# Patient Record
Sex: FEMALE | Race: BLACK OR AFRICAN AMERICAN | NOT HISPANIC OR LATINO
[De-identification: names, ages, dates, MRNs, and addresses within clinical notes are randomized per-mention and may not be internally consistent; named-entity substitution may affect disease eponyms.]

---

## 2017-06-22 PROBLEM — Z00.00 ENCOUNTER FOR PREVENTIVE HEALTH EXAMINATION: Status: ACTIVE | Noted: 2017-06-22

## 2017-08-07 ENCOUNTER — APPOINTMENT (OUTPATIENT)
Dept: OBGYN | Facility: CLINIC | Age: 32
End: 2017-08-07
Payer: OTHER GOVERNMENT

## 2017-08-07 ENCOUNTER — NON-APPOINTMENT (OUTPATIENT)
Age: 32
End: 2017-08-07

## 2017-08-07 VITALS
WEIGHT: 261.5 LBS | SYSTOLIC BLOOD PRESSURE: 121 MMHG | HEIGHT: 66 IN | BODY MASS INDEX: 42.02 KG/M2 | DIASTOLIC BLOOD PRESSURE: 78 MMHG

## 2017-08-07 DIAGNOSIS — D57.3 SICKLE-CELL TRAIT: ICD-10-CM

## 2017-08-07 DIAGNOSIS — Z86.19 PERSONAL HISTORY OF OTHER INFECTIOUS AND PARASITIC DISEASES: ICD-10-CM

## 2017-08-07 PROCEDURE — 90715 TDAP VACCINE 7 YRS/> IM: CPT

## 2017-08-07 PROCEDURE — 90471 IMMUNIZATION ADMIN: CPT

## 2017-08-07 PROCEDURE — 0501F PRENATAL FLOW SHEET: CPT

## 2017-08-07 RX ORDER — PRENATAL VIT NO.130/IRON/FOLIC 27MG-0.8MG
TABLET ORAL
Refills: 0 | Status: ACTIVE | COMMUNITY

## 2017-08-07 RX ORDER — VALACYCLOVIR 500 MG/1
TABLET, FILM COATED ORAL
Refills: 0 | Status: ACTIVE | COMMUNITY

## 2017-08-17 ENCOUNTER — APPOINTMENT (OUTPATIENT)
Dept: ANTEPARTUM | Facility: CLINIC | Age: 32
End: 2017-08-17
Payer: OTHER GOVERNMENT

## 2017-08-17 ENCOUNTER — ASOB RESULT (OUTPATIENT)
Age: 32
End: 2017-08-17

## 2017-08-17 PROCEDURE — 76805 OB US >/= 14 WKS SNGL FETUS: CPT

## 2017-08-17 PROCEDURE — 76819 FETAL BIOPHYS PROFIL W/O NST: CPT

## 2017-08-21 ENCOUNTER — APPOINTMENT (OUTPATIENT)
Dept: OBGYN | Facility: CLINIC | Age: 32
End: 2017-08-21
Payer: OTHER GOVERNMENT

## 2017-08-21 VITALS
DIASTOLIC BLOOD PRESSURE: 73 MMHG | WEIGHT: 260 LBS | SYSTOLIC BLOOD PRESSURE: 112 MMHG | BODY MASS INDEX: 41.78 KG/M2 | HEIGHT: 66 IN

## 2017-08-21 PROCEDURE — 0502F SUBSEQUENT PRENATAL CARE: CPT

## 2017-09-07 ENCOUNTER — APPOINTMENT (OUTPATIENT)
Dept: ANTEPARTUM | Facility: CLINIC | Age: 32
End: 2017-09-07
Payer: OTHER GOVERNMENT

## 2017-09-07 ENCOUNTER — ASOB RESULT (OUTPATIENT)
Age: 32
End: 2017-09-07

## 2017-09-07 ENCOUNTER — APPOINTMENT (OUTPATIENT)
Dept: OBGYN | Facility: CLINIC | Age: 32
End: 2017-09-07
Payer: OTHER GOVERNMENT

## 2017-09-07 VITALS
BODY MASS INDEX: 41.88 KG/M2 | WEIGHT: 260.56 LBS | HEIGHT: 66 IN | SYSTOLIC BLOOD PRESSURE: 136 MMHG | DIASTOLIC BLOOD PRESSURE: 81 MMHG

## 2017-09-07 PROCEDURE — 76816 OB US FOLLOW-UP PER FETUS: CPT

## 2017-09-07 PROCEDURE — 0502F SUBSEQUENT PRENATAL CARE: CPT

## 2017-09-07 PROCEDURE — 76819 FETAL BIOPHYS PROFIL W/O NST: CPT

## 2017-09-10 LAB
C TRACH RRNA SPEC QL NAA+PROBE: NORMAL
GP B STREP DNA SPEC QL NAA+PROBE: DETECTED
GP B STREP DNA SPEC QL NAA+PROBE: NORMAL
HIV1+2 AB SPEC QL IA.RAPID: NONREACTIVE
N GONORRHOEA RRNA SPEC QL NAA+PROBE: NORMAL
SOURCE AMPLIFICATION: NORMAL
SOURCE GBS: NORMAL

## 2017-09-13 ENCOUNTER — APPOINTMENT (OUTPATIENT)
Dept: OBGYN | Facility: CLINIC | Age: 32
End: 2017-09-13
Payer: OTHER GOVERNMENT

## 2017-09-13 VITALS
BODY MASS INDEX: 41.62 KG/M2 | WEIGHT: 259 LBS | SYSTOLIC BLOOD PRESSURE: 121 MMHG | DIASTOLIC BLOOD PRESSURE: 78 MMHG | HEIGHT: 66 IN

## 2017-09-13 VITALS — HEIGHT: 66 IN

## 2017-09-13 PROCEDURE — 0502F SUBSEQUENT PRENATAL CARE: CPT

## 2017-09-20 ENCOUNTER — APPOINTMENT (OUTPATIENT)
Dept: OBGYN | Facility: CLINIC | Age: 32
End: 2017-09-20
Payer: OTHER GOVERNMENT

## 2017-09-20 VITALS
DIASTOLIC BLOOD PRESSURE: 74 MMHG | SYSTOLIC BLOOD PRESSURE: 116 MMHG | WEIGHT: 262 LBS | BODY MASS INDEX: 42.11 KG/M2 | HEIGHT: 66 IN

## 2017-09-20 PROCEDURE — 0502F SUBSEQUENT PRENATAL CARE: CPT

## 2017-09-25 ENCOUNTER — OUTPATIENT (OUTPATIENT)
Dept: OUTPATIENT SERVICES | Facility: HOSPITAL | Age: 32
LOS: 1 days | End: 2017-09-25

## 2017-09-25 DIAGNOSIS — O26.899 OTHER SPECIFIED PREGNANCY RELATED CONDITIONS, UNSPECIFIED TRIMESTER: ICD-10-CM

## 2017-09-25 DIAGNOSIS — Z3A.00 WEEKS OF GESTATION OF PREGNANCY NOT SPECIFIED: ICD-10-CM

## 2017-09-25 LAB
ALBUMIN SERPL ELPH-MCNC: 3.3 G/DL — SIGNIFICANT CHANGE UP (ref 3.3–5)
ALP SERPL-CCNC: 107 U/L — SIGNIFICANT CHANGE UP (ref 40–120)
ALT FLD-CCNC: 7 U/L — SIGNIFICANT CHANGE UP (ref 4–33)
AST SERPL-CCNC: 10 U/L — SIGNIFICANT CHANGE UP (ref 4–32)
BASOPHILS # BLD AUTO: 0.02 K/UL — SIGNIFICANT CHANGE UP (ref 0–0.2)
BASOPHILS NFR BLD AUTO: 0.3 % — SIGNIFICANT CHANGE UP (ref 0–2)
BILIRUB SERPL-MCNC: < 0.2 MG/DL — LOW (ref 0.2–1.2)
BUN SERPL-MCNC: 7 MG/DL — SIGNIFICANT CHANGE UP (ref 7–23)
CALCIUM SERPL-MCNC: 9 MG/DL — SIGNIFICANT CHANGE UP (ref 8.4–10.5)
CHLORIDE SERPL-SCNC: 106 MMOL/L — SIGNIFICANT CHANGE UP (ref 98–107)
CO2 SERPL-SCNC: 20 MMOL/L — LOW (ref 22–31)
CREAT SERPL-MCNC: 0.55 MG/DL — SIGNIFICANT CHANGE UP (ref 0.5–1.3)
EOSINOPHIL # BLD AUTO: 0.04 K/UL — SIGNIFICANT CHANGE UP (ref 0–0.5)
EOSINOPHIL NFR BLD AUTO: 0.6 % — SIGNIFICANT CHANGE UP (ref 0–6)
GLUCOSE SERPL-MCNC: 104 MG/DL — HIGH (ref 70–99)
HCT VFR BLD CALC: 30 % — LOW (ref 34.5–45)
HGB BLD-MCNC: 9.6 G/DL — LOW (ref 11.5–15.5)
IMM GRANULOCYTES # BLD AUTO: 0.04 # — SIGNIFICANT CHANGE UP
IMM GRANULOCYTES NFR BLD AUTO: 0.6 % — SIGNIFICANT CHANGE UP (ref 0–1.5)
LYMPHOCYTES # BLD AUTO: 1.85 K/UL — SIGNIFICANT CHANGE UP (ref 1–3.3)
LYMPHOCYTES # BLD AUTO: 26.6 % — SIGNIFICANT CHANGE UP (ref 13–44)
MCHC RBC-ENTMCNC: 24 PG — LOW (ref 27–34)
MCHC RBC-ENTMCNC: 32 % — SIGNIFICANT CHANGE UP (ref 32–36)
MCV RBC AUTO: 75 FL — LOW (ref 80–100)
MONOCYTES # BLD AUTO: 0.62 K/UL — SIGNIFICANT CHANGE UP (ref 0–0.9)
MONOCYTES NFR BLD AUTO: 8.9 % — SIGNIFICANT CHANGE UP (ref 2–14)
NEUTROPHILS # BLD AUTO: 4.38 K/UL — SIGNIFICANT CHANGE UP (ref 1.8–7.4)
NEUTROPHILS NFR BLD AUTO: 63 % — SIGNIFICANT CHANGE UP (ref 43–77)
NRBC # FLD: 0 — SIGNIFICANT CHANGE UP
PLATELET # BLD AUTO: 279 K/UL — SIGNIFICANT CHANGE UP (ref 150–400)
PMV BLD: 10.7 FL — SIGNIFICANT CHANGE UP (ref 7–13)
POTASSIUM SERPL-MCNC: 4 MMOL/L — SIGNIFICANT CHANGE UP (ref 3.5–5.3)
POTASSIUM SERPL-SCNC: 4 MMOL/L — SIGNIFICANT CHANGE UP (ref 3.5–5.3)
PROT SERPL-MCNC: 6.5 G/DL — SIGNIFICANT CHANGE UP (ref 6–8.3)
RBC # BLD: 4 M/UL — SIGNIFICANT CHANGE UP (ref 3.8–5.2)
RBC # FLD: 15.7 % — HIGH (ref 10.3–14.5)
SODIUM SERPL-SCNC: 139 MMOL/L — SIGNIFICANT CHANGE UP (ref 135–145)
WBC # BLD: 6.95 K/UL — SIGNIFICANT CHANGE UP (ref 3.8–10.5)
WBC # FLD AUTO: 6.95 K/UL — SIGNIFICANT CHANGE UP (ref 3.8–10.5)

## 2017-09-26 PROBLEM — Z00.00 ENCOUNTER FOR PREVENTIVE HEALTH EXAMINATION: Noted: 2017-09-26

## 2017-09-27 LAB — BILE AC FLD-MCNC: 11.7 UMOL/L — SIGNIFICANT CHANGE UP (ref 4.7–24.5)

## 2017-09-28 ENCOUNTER — APPOINTMENT (OUTPATIENT)
Dept: ANTEPARTUM | Facility: CLINIC | Age: 32
End: 2017-09-28
Payer: OTHER GOVERNMENT

## 2017-09-28 ENCOUNTER — OUTPATIENT (OUTPATIENT)
Dept: OUTPATIENT SERVICES | Facility: HOSPITAL | Age: 32
LOS: 1 days | End: 2017-09-28

## 2017-09-28 ENCOUNTER — APPOINTMENT (OUTPATIENT)
Dept: ANTEPARTUM | Facility: CLINIC | Age: 32
End: 2017-09-28

## 2017-09-28 ENCOUNTER — ASOB RESULT (OUTPATIENT)
Age: 32
End: 2017-09-28

## 2017-09-28 ENCOUNTER — APPOINTMENT (OUTPATIENT)
Dept: ANTEPARTUM | Facility: HOSPITAL | Age: 32
End: 2017-09-28

## 2017-09-28 PROCEDURE — 76818 FETAL BIOPHYS PROFILE W/NST: CPT | Mod: 26

## 2017-09-28 PROCEDURE — 76816 OB US FOLLOW-UP PER FETUS: CPT

## 2017-09-29 ENCOUNTER — APPOINTMENT (OUTPATIENT)
Dept: OBGYN | Facility: CLINIC | Age: 32
End: 2017-09-29
Payer: OTHER GOVERNMENT

## 2017-09-29 VITALS
DIASTOLIC BLOOD PRESSURE: 67 MMHG | WEIGHT: 258 LBS | SYSTOLIC BLOOD PRESSURE: 101 MMHG | BODY MASS INDEX: 41.46 KG/M2 | HEIGHT: 66 IN

## 2017-09-29 PROCEDURE — 0502F SUBSEQUENT PRENATAL CARE: CPT

## 2017-09-29 PROCEDURE — 90688 IIV4 VACCINE SPLT 0.5 ML IM: CPT

## 2017-09-29 PROCEDURE — 90471 IMMUNIZATION ADMIN: CPT

## 2017-10-04 ENCOUNTER — APPOINTMENT (OUTPATIENT)
Dept: OBGYN | Facility: CLINIC | Age: 32
End: 2017-10-04
Payer: OTHER GOVERNMENT

## 2017-10-04 VITALS
HEIGHT: 66 IN | BODY MASS INDEX: 41.3 KG/M2 | DIASTOLIC BLOOD PRESSURE: 80 MMHG | SYSTOLIC BLOOD PRESSURE: 119 MMHG | WEIGHT: 257 LBS

## 2017-10-04 PROCEDURE — 0502F SUBSEQUENT PRENATAL CARE: CPT

## 2017-10-06 ENCOUNTER — ASOB RESULT (OUTPATIENT)
Age: 32
End: 2017-10-06

## 2017-10-06 ENCOUNTER — APPOINTMENT (OUTPATIENT)
Dept: ANTEPARTUM | Facility: CLINIC | Age: 32
End: 2017-10-06
Payer: OTHER GOVERNMENT

## 2017-10-06 PROCEDURE — 76819 FETAL BIOPHYS PROFIL W/O NST: CPT

## 2017-10-09 ENCOUNTER — APPOINTMENT (OUTPATIENT)
Dept: ANTEPARTUM | Facility: CLINIC | Age: 32
End: 2017-10-09
Payer: OTHER GOVERNMENT

## 2017-10-09 ENCOUNTER — APPOINTMENT (OUTPATIENT)
Dept: ANTEPARTUM | Facility: HOSPITAL | Age: 32
End: 2017-10-09

## 2017-10-09 ENCOUNTER — OUTPATIENT (OUTPATIENT)
Dept: OUTPATIENT SERVICES | Facility: HOSPITAL | Age: 32
LOS: 1 days | End: 2017-10-09

## 2017-10-09 ENCOUNTER — INPATIENT (INPATIENT)
Facility: HOSPITAL | Age: 32
LOS: 3 days | Discharge: HOME | End: 2017-10-13
Attending: OBSTETRICS & GYNECOLOGY | Admitting: OBSTETRICS & GYNECOLOGY
Payer: OTHER GOVERNMENT

## 2017-10-09 ENCOUNTER — OTHER (OUTPATIENT)
Age: 32
End: 2017-10-09

## 2017-10-09 DIAGNOSIS — O26.899 OTHER SPECIFIED PREGNANCY RELATED CONDITIONS, UNSPECIFIED TRIMESTER: ICD-10-CM

## 2017-10-09 DIAGNOSIS — Z3A.00 WEEKS OF GESTATION OF PREGNANCY NOT SPECIFIED: ICD-10-CM

## 2017-10-09 LAB
BASOPHILS # BLD AUTO: 0.03 K/UL — SIGNIFICANT CHANGE UP (ref 0–0.2)
BASOPHILS NFR BLD AUTO: 0.4 % — SIGNIFICANT CHANGE UP (ref 0–2)
BLD GP AB SCN SERPL QL: NEGATIVE — SIGNIFICANT CHANGE UP
EOSINOPHIL # BLD AUTO: 0.03 K/UL — SIGNIFICANT CHANGE UP (ref 0–0.5)
EOSINOPHIL NFR BLD AUTO: 0.4 % — SIGNIFICANT CHANGE UP (ref 0–6)
HCT VFR BLD CALC: 31.1 % — LOW (ref 34.5–45)
HGB BLD-MCNC: 10.1 G/DL — LOW (ref 11.5–15.5)
IMM GRANULOCYTES # BLD AUTO: 0.04 # — SIGNIFICANT CHANGE UP
IMM GRANULOCYTES NFR BLD AUTO: 0.5 % — SIGNIFICANT CHANGE UP (ref 0–1.5)
LYMPHOCYTES # BLD AUTO: 1.82 K/UL — SIGNIFICANT CHANGE UP (ref 1–3.3)
LYMPHOCYTES # BLD AUTO: 23.7 % — SIGNIFICANT CHANGE UP (ref 13–44)
MCHC RBC-ENTMCNC: 23.9 PG — LOW (ref 27–34)
MCHC RBC-ENTMCNC: 32.5 % — SIGNIFICANT CHANGE UP (ref 32–36)
MCV RBC AUTO: 73.5 FL — LOW (ref 80–100)
MONOCYTES # BLD AUTO: 0.58 K/UL — SIGNIFICANT CHANGE UP (ref 0–0.9)
MONOCYTES NFR BLD AUTO: 7.5 % — SIGNIFICANT CHANGE UP (ref 2–14)
NEUTROPHILS # BLD AUTO: 5.19 K/UL — SIGNIFICANT CHANGE UP (ref 1.8–7.4)
NEUTROPHILS NFR BLD AUTO: 67.5 % — SIGNIFICANT CHANGE UP (ref 43–77)
NRBC # FLD: 0 — SIGNIFICANT CHANGE UP
PLATELET # BLD AUTO: 287 K/UL — SIGNIFICANT CHANGE UP (ref 150–400)
PMV BLD: 10.9 FL — SIGNIFICANT CHANGE UP (ref 7–13)
RBC # BLD: 4.23 M/UL — SIGNIFICANT CHANGE UP (ref 3.8–5.2)
RBC # FLD: 16.2 % — HIGH (ref 10.3–14.5)
RH IG SCN BLD-IMP: POSITIVE — SIGNIFICANT CHANGE UP
RH IG SCN BLD-IMP: POSITIVE — SIGNIFICANT CHANGE UP
WBC # BLD: 7.69 K/UL — SIGNIFICANT CHANGE UP (ref 3.8–10.5)
WBC # FLD AUTO: 7.69 K/UL — SIGNIFICANT CHANGE UP (ref 3.8–10.5)

## 2017-10-09 PROCEDURE — 76818 FETAL BIOPHYS PROFILE W/NST: CPT | Mod: 26

## 2017-10-09 RX ORDER — AMPICILLIN TRIHYDRATE 250 MG
1 CAPSULE ORAL EVERY 4 HOURS
Qty: 0 | Refills: 0 | Status: DISCONTINUED | OUTPATIENT
Start: 2017-10-09 | End: 2017-10-10

## 2017-10-09 RX ORDER — SODIUM CHLORIDE 9 MG/ML
1000 INJECTION, SOLUTION INTRAVENOUS
Qty: 0 | Refills: 0 | Status: DISCONTINUED | OUTPATIENT
Start: 2017-10-09 | End: 2017-10-10

## 2017-10-09 RX ORDER — SODIUM CHLORIDE 9 MG/ML
1000 INJECTION, SOLUTION INTRAVENOUS ONCE
Qty: 0 | Refills: 0 | Status: DISCONTINUED | OUTPATIENT
Start: 2017-10-09 | End: 2017-10-10

## 2017-10-09 RX ORDER — OXYTOCIN 10 UNIT/ML
333.33 VIAL (ML) INJECTION
Qty: 20 | Refills: 0 | Status: DISCONTINUED | OUTPATIENT
Start: 2017-10-09 | End: 2017-10-10

## 2017-10-09 RX ORDER — BUTORPHANOL TARTRATE 2 MG/ML
2 INJECTION, SOLUTION INTRAMUSCULAR; INTRAVENOUS ONCE
Qty: 0 | Refills: 0 | Status: DISCONTINUED | OUTPATIENT
Start: 2017-10-09 | End: 2017-10-09

## 2017-10-09 RX ORDER — AMPICILLIN TRIHYDRATE 250 MG
2 CAPSULE ORAL ONCE
Qty: 0 | Refills: 0 | Status: COMPLETED | OUTPATIENT
Start: 2017-10-09 | End: 2017-10-09

## 2017-10-09 RX ORDER — AMPICILLIN TRIHYDRATE 250 MG
CAPSULE ORAL
Qty: 0 | Refills: 0 | Status: DISCONTINUED | OUTPATIENT
Start: 2017-10-09 | End: 2017-10-10

## 2017-10-09 RX ADMIN — SODIUM CHLORIDE 125 MILLILITER(S): 9 INJECTION, SOLUTION INTRAVENOUS at 16:30

## 2017-10-09 RX ADMIN — BUTORPHANOL TARTRATE 2 MILLIGRAM(S): 2 INJECTION, SOLUTION INTRAMUSCULAR; INTRAVENOUS at 20:30

## 2017-10-09 RX ADMIN — Medication 108 GRAM(S): at 21:40

## 2017-10-09 RX ADMIN — BUTORPHANOL TARTRATE 2 MILLIGRAM(S): 2 INJECTION, SOLUTION INTRAMUSCULAR; INTRAVENOUS at 21:00

## 2017-10-09 RX ADMIN — Medication 216 GRAM(S): at 17:11

## 2017-10-09 RX ADMIN — Medication 204 MILLIGRAM(S): at 20:30

## 2017-10-10 ENCOUNTER — APPOINTMENT (OUTPATIENT)
Dept: OBGYN | Facility: HOSPITAL | Age: 32
End: 2017-10-10

## 2017-10-10 ENCOUNTER — TRANSCRIPTION ENCOUNTER (OUTPATIENT)
Age: 32
End: 2017-10-10

## 2017-10-10 VITALS — WEIGHT: 257.94 LBS | HEIGHT: 66 IN

## 2017-10-10 LAB
BUN SERPL-MCNC: 6 MG/DL — LOW (ref 7–23)
CALCIUM SERPL-MCNC: 8 MG/DL — LOW (ref 8.4–10.5)
CHLORIDE SERPL-SCNC: 103 MMOL/L — SIGNIFICANT CHANGE UP (ref 98–107)
CO2 SERPL-SCNC: 22 MMOL/L — SIGNIFICANT CHANGE UP (ref 22–31)
CREAT SERPL-MCNC: 0.52 MG/DL — SIGNIFICANT CHANGE UP (ref 0.5–1.3)
GLUCOSE SERPL-MCNC: 81 MG/DL — SIGNIFICANT CHANGE UP (ref 70–99)
HCT VFR BLD CALC: 28.6 % — LOW (ref 34.5–45)
HGB BLD-MCNC: 9.5 G/DL — LOW (ref 11.5–15.5)
MCHC RBC-ENTMCNC: 24.5 PG — LOW (ref 27–34)
MCHC RBC-ENTMCNC: 33.2 % — SIGNIFICANT CHANGE UP (ref 32–36)
MCV RBC AUTO: 73.7 FL — LOW (ref 80–100)
NRBC # FLD: 0 — SIGNIFICANT CHANGE UP
PLATELET # BLD AUTO: 227 K/UL — SIGNIFICANT CHANGE UP (ref 150–400)
PMV BLD: 10.7 FL — SIGNIFICANT CHANGE UP (ref 7–13)
POTASSIUM SERPL-MCNC: 4.3 MMOL/L — SIGNIFICANT CHANGE UP (ref 3.5–5.3)
POTASSIUM SERPL-SCNC: 4.3 MMOL/L — SIGNIFICANT CHANGE UP (ref 3.5–5.3)
RBC # BLD: 3.88 M/UL — SIGNIFICANT CHANGE UP (ref 3.8–5.2)
RBC # FLD: 16.3 % — HIGH (ref 10.3–14.5)
RUBV IGG SER-ACNC: 5.4 INDEX — SIGNIFICANT CHANGE UP
RUBV IGG SER-IMP: POSITIVE — SIGNIFICANT CHANGE UP
SODIUM SERPL-SCNC: 136 MMOL/L — SIGNIFICANT CHANGE UP (ref 135–145)
T PALLIDUM AB TITR SER: NEGATIVE — SIGNIFICANT CHANGE UP
WBC # BLD: 8.94 K/UL — SIGNIFICANT CHANGE UP (ref 3.8–10.5)
WBC # FLD AUTO: 8.94 K/UL — SIGNIFICANT CHANGE UP (ref 3.8–10.5)

## 2017-10-10 PROCEDURE — 59515 CESAREAN DELIVERY: CPT | Mod: U8,UB

## 2017-10-10 RX ORDER — OXYTOCIN 10 UNIT/ML
41.67 VIAL (ML) INJECTION
Qty: 20 | Refills: 0 | Status: DISCONTINUED | OUTPATIENT
Start: 2017-10-10 | End: 2017-10-11

## 2017-10-10 RX ORDER — ACETAMINOPHEN 500 MG
975 TABLET ORAL EVERY 6 HOURS
Qty: 0 | Refills: 0 | Status: DISCONTINUED | OUTPATIENT
Start: 2017-10-10 | End: 2017-10-13

## 2017-10-10 RX ORDER — METOCLOPRAMIDE HCL 10 MG
10 TABLET ORAL ONCE
Qty: 0 | Refills: 0 | Status: COMPLETED | OUTPATIENT
Start: 2017-10-10 | End: 2017-10-10

## 2017-10-10 RX ORDER — SODIUM CHLORIDE 9 MG/ML
1000 INJECTION, SOLUTION INTRAVENOUS ONCE
Qty: 0 | Refills: 0 | Status: COMPLETED | OUTPATIENT
Start: 2017-10-10 | End: 2017-10-10

## 2017-10-10 RX ORDER — DIPHENHYDRAMINE HCL 50 MG
25 CAPSULE ORAL EVERY 4 HOURS
Qty: 0 | Refills: 0 | Status: DISCONTINUED | OUTPATIENT
Start: 2017-10-11 | End: 2017-10-11

## 2017-10-10 RX ORDER — OXYCODONE HYDROCHLORIDE 5 MG/1
10 TABLET ORAL
Qty: 0 | Refills: 0 | Status: DISCONTINUED | OUTPATIENT
Start: 2017-10-11 | End: 2017-10-11

## 2017-10-10 RX ORDER — CITRIC ACID/SODIUM CITRATE 300-500 MG
30 SOLUTION, ORAL ORAL ONCE
Qty: 0 | Refills: 0 | Status: COMPLETED | OUTPATIENT
Start: 2017-10-10 | End: 2017-10-10

## 2017-10-10 RX ORDER — SODIUM CHLORIDE 9 MG/ML
1000 INJECTION, SOLUTION INTRAVENOUS
Qty: 0 | Refills: 0 | Status: DISCONTINUED | OUTPATIENT
Start: 2017-10-11 | End: 2017-10-11

## 2017-10-10 RX ORDER — ONDANSETRON 8 MG/1
4 TABLET, FILM COATED ORAL EVERY 6 HOURS
Qty: 0 | Refills: 0 | Status: DISCONTINUED | OUTPATIENT
Start: 2017-10-11 | End: 2017-10-11

## 2017-10-10 RX ORDER — FERROUS SULFATE 325(65) MG
325 TABLET ORAL DAILY
Qty: 0 | Refills: 0 | Status: DISCONTINUED | OUTPATIENT
Start: 2017-10-11 | End: 2017-10-11

## 2017-10-10 RX ORDER — NALOXONE HYDROCHLORIDE 4 MG/.1ML
0.1 SPRAY NASAL
Qty: 0 | Refills: 0 | Status: DISCONTINUED | OUTPATIENT
Start: 2017-10-11 | End: 2017-10-11

## 2017-10-10 RX ORDER — IBUPROFEN 200 MG
600 TABLET ORAL EVERY 6 HOURS
Qty: 0 | Refills: 0 | Status: COMPLETED | OUTPATIENT
Start: 2017-10-10 | End: 2018-09-08

## 2017-10-10 RX ORDER — SODIUM CHLORIDE 9 MG/ML
1000 INJECTION, SOLUTION INTRAVENOUS
Qty: 0 | Refills: 0 | Status: DISCONTINUED | OUTPATIENT
Start: 2017-10-10 | End: 2017-10-11

## 2017-10-10 RX ORDER — OXYCODONE HYDROCHLORIDE 5 MG/1
5 TABLET ORAL
Qty: 0 | Refills: 0 | Status: DISCONTINUED | OUTPATIENT
Start: 2017-10-11 | End: 2017-10-11

## 2017-10-10 RX ORDER — DIPHENHYDRAMINE HCL 50 MG
25 CAPSULE ORAL EVERY 6 HOURS
Qty: 0 | Refills: 0 | Status: DISCONTINUED | OUTPATIENT
Start: 2017-10-11 | End: 2017-10-13

## 2017-10-10 RX ORDER — HEPARIN SODIUM 5000 [USP'U]/ML
5000 INJECTION INTRAVENOUS; SUBCUTANEOUS EVERY 12 HOURS
Qty: 0 | Refills: 0 | Status: DISCONTINUED | OUTPATIENT
Start: 2017-10-10 | End: 2017-10-13

## 2017-10-10 RX ORDER — SIMETHICONE 80 MG/1
80 TABLET, CHEWABLE ORAL EVERY 4 HOURS
Qty: 0 | Refills: 0 | Status: DISCONTINUED | OUTPATIENT
Start: 2017-10-11 | End: 2017-10-13

## 2017-10-10 RX ORDER — IBUPROFEN 200 MG
1 TABLET ORAL
Qty: 0 | Refills: 0 | DISCHARGE
Start: 2017-10-10

## 2017-10-10 RX ORDER — SODIUM CHLORIDE 9 MG/ML
500 INJECTION, SOLUTION INTRAVENOUS ONCE
Qty: 0 | Refills: 0 | Status: COMPLETED | OUTPATIENT
Start: 2017-10-10 | End: 2017-10-10

## 2017-10-10 RX ORDER — OXYCODONE HYDROCHLORIDE 5 MG/1
5 TABLET ORAL EVERY 4 HOURS
Qty: 0 | Refills: 0 | Status: COMPLETED | OUTPATIENT
Start: 2017-10-10 | End: 2017-10-17

## 2017-10-10 RX ORDER — OXYTOCIN 10 UNIT/ML
333.33 VIAL (ML) INJECTION
Qty: 20 | Refills: 0 | Status: DISCONTINUED | OUTPATIENT
Start: 2017-10-10 | End: 2017-10-11

## 2017-10-10 RX ORDER — TETANUS TOXOID, REDUCED DIPHTHERIA TOXOID AND ACELLULAR PERTUSSIS VACCINE, ADSORBED 5; 2.5; 8; 8; 2.5 [IU]/.5ML; [IU]/.5ML; UG/.5ML; UG/.5ML; UG/.5ML
0.5 SUSPENSION INTRAMUSCULAR ONCE
Qty: 0 | Refills: 0 | Status: DISCONTINUED | OUTPATIENT
Start: 2017-10-11 | End: 2017-10-13

## 2017-10-10 RX ORDER — OXYCODONE HYDROCHLORIDE 5 MG/1
5 TABLET ORAL
Qty: 0 | Refills: 0 | Status: COMPLETED | OUTPATIENT
Start: 2017-10-10 | End: 2017-10-17

## 2017-10-10 RX ORDER — KETOROLAC TROMETHAMINE 30 MG/ML
30 SYRINGE (ML) INJECTION EVERY 6 HOURS
Qty: 0 | Refills: 0 | Status: DISCONTINUED | OUTPATIENT
Start: 2017-10-10 | End: 2017-10-12

## 2017-10-10 RX ORDER — HYDROMORPHONE HYDROCHLORIDE 2 MG/ML
1 INJECTION INTRAMUSCULAR; INTRAVENOUS; SUBCUTANEOUS
Qty: 0 | Refills: 0 | Status: DISCONTINUED | OUTPATIENT
Start: 2017-10-10 | End: 2017-10-11

## 2017-10-10 RX ORDER — FAMOTIDINE 10 MG/ML
20 INJECTION INTRAVENOUS ONCE
Qty: 0 | Refills: 0 | Status: COMPLETED | OUTPATIENT
Start: 2017-10-10 | End: 2017-10-10

## 2017-10-10 RX ORDER — DOCUSATE SODIUM 100 MG
100 CAPSULE ORAL
Qty: 0 | Refills: 0 | Status: DISCONTINUED | OUTPATIENT
Start: 2017-10-11 | End: 2017-10-11

## 2017-10-10 RX ORDER — GLYCERIN ADULT
1 SUPPOSITORY, RECTAL RECTAL AT BEDTIME
Qty: 0 | Refills: 0 | Status: DISCONTINUED | OUTPATIENT
Start: 2017-10-11 | End: 2017-10-13

## 2017-10-10 RX ORDER — LANOLIN
1 OINTMENT (GRAM) TOPICAL
Qty: 0 | Refills: 0 | Status: DISCONTINUED | OUTPATIENT
Start: 2017-10-11 | End: 2017-10-13

## 2017-10-10 RX ADMIN — SODIUM CHLORIDE 1000 MILLILITER(S): 9 INJECTION, SOLUTION INTRAVENOUS at 20:00

## 2017-10-10 RX ADMIN — FAMOTIDINE 20 MILLIGRAM(S): 10 INJECTION INTRAVENOUS at 13:20

## 2017-10-10 RX ADMIN — SODIUM CHLORIDE 200 MILLILITER(S): 9 INJECTION, SOLUTION INTRAVENOUS at 21:21

## 2017-10-10 RX ADMIN — Medication 108 GRAM(S): at 01:26

## 2017-10-10 RX ADMIN — Medication 30 MILLILITER(S): at 13:20

## 2017-10-10 RX ADMIN — SODIUM CHLORIDE 2000 MILLILITER(S): 9 INJECTION, SOLUTION INTRAVENOUS at 16:58

## 2017-10-10 RX ADMIN — Medication 108 GRAM(S): at 09:26

## 2017-10-10 RX ADMIN — Medication 125 MILLIUNIT(S)/MIN: at 16:24

## 2017-10-10 RX ADMIN — HYDROMORPHONE HYDROCHLORIDE 1 MILLIGRAM(S): 2 INJECTION INTRAMUSCULAR; INTRAVENOUS; SUBCUTANEOUS at 18:55

## 2017-10-10 RX ADMIN — Medication 108 GRAM(S): at 05:47

## 2017-10-10 RX ADMIN — SODIUM CHLORIDE 2000 MILLILITER(S): 9 INJECTION, SOLUTION INTRAVENOUS at 22:00

## 2017-10-10 RX ADMIN — Medication 10 MILLIGRAM(S): at 13:20

## 2017-10-10 RX ADMIN — HYDROMORPHONE HYDROCHLORIDE 1 MILLIGRAM(S): 2 INJECTION INTRAMUSCULAR; INTRAVENOUS; SUBCUTANEOUS at 18:38

## 2017-10-10 NOTE — DISCHARGE NOTE OB - CARE PROVIDER_API CALL
Twila Blackburn (MD), Obstetrics and Gynecology  [facility] [place] [address]  Phone: [phone]  Fax: [phone]

## 2017-10-10 NOTE — PROGRESS NOTE ADULT - SUBJECTIVE AND OBJECTIVE BOX
Subjective  Patient evaluated at bedside for oliguria s/p c/s.  Feels well, pain well controlled. Patient denies chest pain, shortness of breath, fevers, chills, nausea, vomiting, diarrhea.  Not lightheaded dizzy or weak.    acetaminophen   Tablet. 975 milliGRAM(s) Oral every 6 hours  heparin  Injectable 5000 Unit(s) SubCutaneous every 12 hours  HYDROmorphone  Injectable 1 milliGRAM(s) IV Push every 3 hours PRN  ibuprofen  Tablet 600 milliGRAM(s) Oral every 6 hours  ketorolac   Injectable 30 milliGRAM(s) IV Push every 6 hours  lactated ringers Bolus 1000 milliLiter(s) IV Bolus once  lactated ringers. 1000 milliLiter(s) IV Continuous <Continuous>  oxyCODONE    IR 5 milliGRAM(s) Oral every 3 hours  oxyCODONE    IR 5 milliGRAM(s) Oral every 4 hours PRN  oxytocin Infusion 41.667 milliUNIT(s)/Min IV Continuous <Continuous>  oxytocin Infusion 333.333 milliUNIT(s)/Min IV Continuous <Continuous>  oxytocin Infusion 41.667 milliUNIT(s)/Min IV Continuous <Continuous>      Objective  Physical exam  VS: T(C): 37 (10-10-17 @ 15:26), Max: 37 (10-10-17 @ 15:26)  HR: 68 (10-10-17 @ 21:30) (66 - 88)  BP: 113/53 (10-10-17 @ 21:30) (90/64 - 142/89)  RR: 16 (10-10-17 @ 21:30) (9 - 24)  SpO2: 100% (10-10-17 @ 21:30) (96% - 100%)  Gen: No acute distress, alert and oriented  Abd: soft nt nd, fundus firm  Ext: nontender bilaterally, SCDs in place    FAST: no perihepatic or perisplenic blood, no blood in pouch of britta  Lung sono: no increased b lines.    10-10 @ 07:01  -  10-10 @ 21:55  --------------------------------------------------------  IN: 6725 mL / OUT: 1065 mL / NET: 5660 mL

## 2017-10-10 NOTE — PROGRESS NOTE ADULT - ASSESSMENT
A/P  31yF POD#0 s/p c/s   EBL intraop was 800, uncomplicated.  Patient received 2000mL in OR and has received 1500mL bolus following, currently on maintenance 200cc/hour.  UOP has been 40cc->30cc->50cc->25cc->20cc.  Adequate UOP is for pt is 58cc/hour.  - STAT CBC, BMP  - 1L LR bolus  - Continue to monitor in PACU    D/w Dr Bere Crane PGY3  [id number]

## 2017-10-10 NOTE — DISCHARGE NOTE OB - MEDICATION SUMMARY - MEDICATIONS TO TAKE
I will START or STAY ON the medications listed below when I get home from the hospital:    ibuprofen 600 mg oral tablet  -- 1 tab(s) by mouth every 6 hours  -- Indication: For Weeks of gestation of pregnancy not specified    PrenaCare oral tablet  -- 1 tab(s) by mouth once a day  -- Indication: For Weeks of gestation of pregnancy not specified

## 2017-10-10 NOTE — DISCHARGE NOTE OB - HOSPITAL COURSE
Patient desired initially elective  section. Patient came in with PROM and decided to be induced. Received Po cytotec and progressed to 2.5 cm and remained for many hours at this dilatation. Patient decided to have  section after many hours of no change

## 2017-10-10 NOTE — DISCHARGE NOTE OB - CARE PLAN
Principal Discharge DX:	Failed induction of labor, unspecified type  Goal:	routine postop recovery  Instructions for follow-up, activity and diet:	pelvic rest x 6 weeks

## 2017-10-10 NOTE — DISCHARGE NOTE OB - PATIENT PORTAL LINK FT
“You can access the [facility] Patient Portal, offered by [facility], by registering with the following website: [email or web address]”

## 2017-10-11 ENCOUNTER — TRANSCRIPTION ENCOUNTER (OUTPATIENT)
Age: 32
End: 2017-10-11

## 2017-10-11 DIAGNOSIS — O41.93X0 DISORDER OF AMNIOTIC FLUID AND MEMBRANES, UNSPECIFIED, THIRD TRIMESTER, NOT APPLICABLE OR UNSPECIFIED: ICD-10-CM

## 2017-10-11 DIAGNOSIS — O36.61X0 MATERNAL CARE FOR EXCESSIVE FETAL GROWTH, FIRST TRIMESTER, NOT APPLICABLE OR UNSPECIFIED: ICD-10-CM

## 2017-10-11 DIAGNOSIS — O28.3 ABNORMAL ULTRASONIC FINDING ON ANTENATAL SCREENING OF MOTHER: ICD-10-CM

## 2017-10-11 LAB
BASOPHILS # BLD AUTO: 0.02 K/UL — SIGNIFICANT CHANGE UP (ref 0–0.2)
BASOPHILS NFR BLD AUTO: 0.2 % — SIGNIFICANT CHANGE UP (ref 0–2)
EOSINOPHIL # BLD AUTO: 0.04 K/UL — SIGNIFICANT CHANGE UP (ref 0–0.5)
EOSINOPHIL NFR BLD AUTO: 0.5 % — SIGNIFICANT CHANGE UP (ref 0–6)
HCT VFR BLD CALC: 27.7 % — LOW (ref 34.5–45)
HGB BLD-MCNC: 9.2 G/DL — LOW (ref 11.5–15.5)
IMM GRANULOCYTES # BLD AUTO: 0.04 # — SIGNIFICANT CHANGE UP
IMM GRANULOCYTES NFR BLD AUTO: 0.5 % — SIGNIFICANT CHANGE UP (ref 0–1.5)
LYMPHOCYTES # BLD AUTO: 1.13 K/UL — SIGNIFICANT CHANGE UP (ref 1–3.3)
LYMPHOCYTES # BLD AUTO: 13.8 % — SIGNIFICANT CHANGE UP (ref 13–44)
MCHC RBC-ENTMCNC: 24.3 PG — LOW (ref 27–34)
MCHC RBC-ENTMCNC: 33.2 % — SIGNIFICANT CHANGE UP (ref 32–36)
MCV RBC AUTO: 73.3 FL — LOW (ref 80–100)
MONOCYTES # BLD AUTO: 0.73 K/UL — SIGNIFICANT CHANGE UP (ref 0–0.9)
MONOCYTES NFR BLD AUTO: 8.9 % — SIGNIFICANT CHANGE UP (ref 2–14)
NEUTROPHILS # BLD AUTO: 6.25 K/UL — SIGNIFICANT CHANGE UP (ref 1.8–7.4)
NEUTROPHILS NFR BLD AUTO: 76.1 % — SIGNIFICANT CHANGE UP (ref 43–77)
NRBC # FLD: 0 — SIGNIFICANT CHANGE UP
PLATELET # BLD AUTO: 241 K/UL — SIGNIFICANT CHANGE UP (ref 150–400)
PMV BLD: 10.7 FL — SIGNIFICANT CHANGE UP (ref 7–13)
RBC # BLD: 3.78 M/UL — LOW (ref 3.8–5.2)
RBC # FLD: 16.4 % — HIGH (ref 10.3–14.5)
WBC # BLD: 8.21 K/UL — SIGNIFICANT CHANGE UP (ref 3.8–10.5)
WBC # FLD AUTO: 8.21 K/UL — SIGNIFICANT CHANGE UP (ref 3.8–10.5)

## 2017-10-11 RX ORDER — ASCORBIC ACID 60 MG
500 TABLET,CHEWABLE ORAL DAILY
Qty: 0 | Refills: 0 | Status: DISCONTINUED | OUTPATIENT
Start: 2017-10-11 | End: 2017-10-13

## 2017-10-11 RX ORDER — DOCUSATE SODIUM 100 MG
100 CAPSULE ORAL
Qty: 0 | Refills: 0 | Status: DISCONTINUED | OUTPATIENT
Start: 2017-10-11 | End: 2017-10-13

## 2017-10-11 RX ORDER — IBUPROFEN 200 MG
600 TABLET ORAL EVERY 6 HOURS
Qty: 0 | Refills: 0 | Status: DISCONTINUED | OUTPATIENT
Start: 2017-10-11 | End: 2017-10-13

## 2017-10-11 RX ORDER — FERROUS SULFATE 325(65) MG
325 TABLET ORAL
Qty: 0 | Refills: 0 | Status: DISCONTINUED | OUTPATIENT
Start: 2017-10-11 | End: 2017-10-13

## 2017-10-11 RX ADMIN — Medication 30 MILLIGRAM(S): at 06:47

## 2017-10-11 RX ADMIN — Medication 975 MILLIGRAM(S): at 07:45

## 2017-10-11 RX ADMIN — HEPARIN SODIUM 5000 UNIT(S): 5000 INJECTION INTRAVENOUS; SUBCUTANEOUS at 13:22

## 2017-10-11 RX ADMIN — Medication 975 MILLIGRAM(S): at 18:10

## 2017-10-11 RX ADMIN — Medication 975 MILLIGRAM(S): at 12:50

## 2017-10-11 RX ADMIN — Medication 975 MILLIGRAM(S): at 06:48

## 2017-10-11 RX ADMIN — Medication 30 MILLIGRAM(S): at 07:45

## 2017-10-11 RX ADMIN — Medication 30 MILLIGRAM(S): at 11:55

## 2017-10-11 RX ADMIN — Medication 30 MILLIGRAM(S): at 12:50

## 2017-10-11 RX ADMIN — HEPARIN SODIUM 5000 UNIT(S): 5000 INJECTION INTRAVENOUS; SUBCUTANEOUS at 01:22

## 2017-10-11 RX ADMIN — Medication 600 MILLIGRAM(S): at 18:09

## 2017-10-11 RX ADMIN — Medication 975 MILLIGRAM(S): at 11:54

## 2017-10-11 NOTE — LACTATION INITIAL EVALUATION - LACTATION INTERVENTIONS
initiate skin to skin/initiate dual electric pump routine/initiate hand expression routine/Assisted with latch and position on L side.  Instructed in reverse pressure massage.  Infant latched in short burst.  Shown hand expression.  Only able to express drops at the tip of the nipple.  Mother instructed in triple feeding.   Parents instructed in syringe feeding with good return demonstration.

## 2017-10-11 NOTE — PROGRESS NOTE ADULT - SUBJECTIVE AND OBJECTIVE BOX
PGY3 Progress Note    VS  VS: T(C): 37 (10-10-17 @ 15:26), Max: 37 (10-10-17 @ 15:26)  HR: 77 (10-11-17 @ 00:00) (66 - 88)  BP: 109/58 (10-11-17 @ 00:00) (90/64 - 142/89)  RR: 20 (10-11-17 @ 00:00) (9 - 24)  SpO2: 99% (10-11-17 @ 00:00) (96% - 100%)      10-10 @ 07:01  -  10-11 @ 01:14  --------------------------------------------------------  IN: 8125 mL / OUT: 1190 mL / NET: 6935 mL    UOP 65cc -> at least 100cc so far this hour                          9.5    8.94  )-----------( 227      ( 10 Oct 2017 22:15 )             28.6                         10.1   7.69  )-----------( 287      ( 09 Oct 2017 15:30 )             31.1     10-10    136  |  103  |  6<L>  ----------------------------<  81  4.3   |  22  |  0.52    Ca    8.0<L>      10 Oct 2017 22:15    A/P  31yF POD#0 s/p c/s w/ oliguria, resolving, cbc with less than appropriate decrease in Hct, patient likely hemoconcentrated.  - Patient can go to postpartum floor after two hours of adequate UOP    D/w Dr Bere Crane PGY3  [id number]

## 2017-10-11 NOTE — PROGRESS NOTE ADULT - SUBJECTIVE AND OBJECTIVE BOX
ANESTHESIA POSTOP CHECK    31y Female POSTOP DAY 1 S/P     Vital Signs Last 24 Hrs  T(C): 36.8 (11 Oct 2017 06:12), Max: 37.1 (11 Oct 2017 01:35)  T(F): 98.3 (11 Oct 2017 06:12), Max: 98.7 (11 Oct 2017 01:35)  HR: 80 (11 Oct 2017 06:12) (66 - 88)  BP: 100/55 (11 Oct 2017 06:12) (90/64 - 142/89)  BP(mean): 69 (11 Oct 2017 00:00) (60 - 103)  RR: 18 (11 Oct 2017 06:12) (9 - 24)  SpO2: 98% (11 Oct 2017 06:12) (96% - 100%)  I&O's Summary    10 Oct 2017 07:01  -  11 Oct 2017 07:00  --------------------------------------------------------  IN: 8125 mL / OUT: 2490 mL / NET: 5635 mL        [x ] NO APPARENT ANESTHESIA COMPLICATIONS      Comments:

## 2017-10-11 NOTE — LACTATION INITIAL EVALUATION - INTERVENTION OUTCOME
good return demonstration/needs not met/Will continue to follow and assist as needed./demonstrates understanding of teaching/verbalizes understanding

## 2017-10-11 NOTE — PROGRESS NOTE ADULT - SUBJECTIVE AND OBJECTIVE BOX
Postop Day  __1_ s/p   C- Section    THERAPY:  [  ] Spinal morphine   [ x ] Epidural morphine   [  ] IV PCA Hydromorphone 1 mg/ml    T(C): 36.8 (10-11-17 @ 06:12), Max: 36.8 (10-11-17 @ 06:12)  HR: 80 (10-11-17 @ 06:12) (80 - 80)  BP: 100/55 (10-11-17 @ 06:12) (100/55 - 100/55)  RR: 18 (10-11-17 @ 06:12) (18 - 18)  SpO2: 98% (10-11-17 @ 06:12) (98% - 98%)    MEDICATIONS  (STANDING):  acetaminophen   Tablet. 975 milliGRAM(s) Oral every 6 hours  ascorbic acid 500 milliGRAM(s) Oral daily  diphtheria/tetanus/pertussis (acellular) Vaccine (ADAcel) 0.5 milliLiter(s) IntraMuscular once  docusate sodium 100 milliGRAM(s) Oral two times a day  ferrous    sulfate 325 milliGRAM(s) Oral three times a day with meals  heparin  Injectable 5000 Unit(s) SubCutaneous every 12 hours  ibuprofen  Tablet 600 milliGRAM(s) Oral every 6 hours  ketorolac   Injectable 30 milliGRAM(s) IV Push every 6 hours  lactated ringers. 1000 milliLiter(s) (125 mL/Hr) IV Continuous <Continuous>  oxyCODONE    IR 5 milliGRAM(s) Oral every 3 hours  prenatal multivitamin 1 Tablet(s) Oral daily    MEDICATIONS  (PRN):  diphenhydrAMINE   Capsule 25 milliGRAM(s) Oral every 6 hours PRN Itching  diphenhydrAMINE   Injectable 25 milliGRAM(s) IV Push every 4 hours PRN Pruritus  glycerin Suppository - Adult 1 Suppository(s) Rectal at bedtime PRN Constipation  HYDROmorphone  Injectable 1 milliGRAM(s) IV Push every 3 hours PRN Severe Pain  lanolin Ointment 1 Application(s) Topical every 3 hours PRN Sore Nipples  naloxone Injectable 0.1 milliGRAM(s) IV Push every 3 minutes PRN For ANY of the following changes in patient status:  A. RR LESS THAN 10 breaths per minute, B. Oxygen saturation LESS THAN 90%, C. Sedation score of 6  ondansetron Injectable 4 milliGRAM(s) IV Push every 6 hours PRN Nausea  oxyCODONE    IR 5 milliGRAM(s) Oral every 3 hours PRN Mild Pain  oxyCODONE    IR 10 milliGRAM(s) Oral every 3 hours PRN Moderate Pain  oxyCODONE    IR 5 milliGRAM(s) Oral every 4 hours PRN Severe Pain (7 - 10)  simethicone 80 milliGRAM(s) Chew every 4 hours PRN Gas      Pain:   ______mild_____ at rest;  ______mild_____with activity    Sedation Score:	  [ x ] Alert	    [  ] Drowsy        [  ] Arousable	[  ] Asleep	[  ] Unresponsive    Side Effects:	  [  ] None	     [  ] Nausea        [ x ] Pruritus        [  ] Weakness   [  ] Numbness        ASSESSMENT/ PLAN  [ x  ] Side effects resolving      [ x  ] Patient made aware of PRN meds available     [ x] Discontinue & switch to PRN pain medications        [  ] Continue       Comments: Patient states lower extremities feel and move normally. No apparent anesthetic complications.

## 2017-10-11 NOTE — PROGRESS NOTE ADULT - PROBLEM SELECTOR PLAN 1
-AM CBC shows stable Hct. 28.6->27.7  - Continue regular diet.  - Increase ambulation.  - Continue motrin, tylenol, oxycodone PRN for pain control.    - Discontinue parker catheter at 24 hours post-op

## 2017-10-11 NOTE — PROGRESS NOTE ADULT - SUBJECTIVE AND OBJECTIVE BOX
OB Progress Note: Primary  Delivery, POD#1    S: 32yo  POD#1 s/p pLTCS . Her pain is well controlled. She is tolerating a regular diet. Not yet passing flatus. Denies N/V. Denies CP/SOB/lightheadedness/dizziness.    She has not yet been out of bed.  Indwelling catheter is in place. Bandage removed.   O:   Vital Signs Last 24 Hrs  T(C): 36.8 (11 Oct 2017 06:12), Max: 37.1 (11 Oct 2017 01:35)  T(F): 98.3 (11 Oct 2017 06:12), Max: 98.7 (11 Oct 2017 01:35)  HR: 80 (11 Oct 2017 06:12) (66 - 88)  BP: 100/55 (11 Oct 2017 06:12) (90/64 - 142/89)  BP(mean): 69 (11 Oct 2017 00:00) (60 - 103)  RR: 18 (11 Oct 2017 06:12) (9 - 24)  SpO2: 98% (11 Oct 2017 06:12) (96% - 100%)    Labs:  Blood type: B Positive  Rubella IgG: Positive (10-09 @ 17:10)  RPR: Negative                          9.2<L>   8.21 >-----------< 241    ( 10-11 @ 06:00 )             27.7<L>                        9.5<L>   8.94 >-----------< 227    ( 10-10 @ 22:15 )             28.6<L>                        10.1<L>   7.69 >-----------< 287    ( 10-09 @ 15:30 )             31.1<L>    10-10-17 @ 22:15      136  |  103  |  6<L>  ----------------------------<  81  4.3   |  22  |  0.52        Ca    8.0<L>      10 Oct 2017 22:15            PE:  General: NAD  Abdomen: Mildly distended, appropriately tender, incision c/d/i.  Extremities: No erythema, no pitting edema          Brisa Alcaraz, PGY-1

## 2017-10-11 NOTE — PROGRESS NOTE ADULT - SUBJECTIVE AND OBJECTIVE BOX
Postpartum Note,  Section  She is a  31y woman who is now post-operative day #1    Subjective:  The patient feels well.  She is ambulating.   She is tolerating regular diet.  She denies nausea and vomiting.  She is voiding.  Her pain is controlled.  She reports normal postpartum bleeding.  She is breastfeeding.      Physical exam:    Vital Signs Last 24 Hrs  T(C): 37 (11 Oct 2017 10:30), Max: 37.1 (11 Oct 2017 01:35)  T(F): 98.6 (11 Oct 2017 10:30), Max: 98.7 (11 Oct 2017 01:35)  HR: 83 (11 Oct 2017 10:30) (66 - 88)  BP: 101/57 (11 Oct 2017 10:30) (90/64 - 142/89)  BP(mean): 69 (11 Oct 2017 00:00) (60 - 103)  RR: 18 (11 Oct 2017 10:30) (9 - 24)  SpO2: 99% (11 Oct 2017 10:30) (96% - 100%)    Gen: NAD  Breast: Soft, nontender, not engorged.  Abdomen: Soft, nontender, no distension , firm uterine fundus at umbilicus.  Incision: Clean, dry, and intact with steri strips  Pelvic: Normal lochia noted  Ext: No calf tenderness    LABS:                        9.2    8.21  )-----------( 241      ( 11 Oct 2017 06:00 )             27.7       Rubella status:     Allergies    No Known Allergies    Intolerances      MEDICATIONS  (STANDING):  acetaminophen   Tablet. 975 milliGRAM(s) Oral every 6 hours  ascorbic acid 500 milliGRAM(s) Oral daily  diphtheria/tetanus/pertussis (acellular) Vaccine (ADAcel) 0.5 milliLiter(s) IntraMuscular once  docusate sodium 100 milliGRAM(s) Oral two times a day  ferrous    sulfate 325 milliGRAM(s) Oral three times a day with meals  heparin  Injectable 5000 Unit(s) SubCutaneous every 12 hours  ibuprofen  Tablet 600 milliGRAM(s) Oral every 6 hours  ketorolac   Injectable 30 milliGRAM(s) IV Push every 6 hours  lactated ringers. 1000 milliLiter(s) (125 mL/Hr) IV Continuous <Continuous>  oxyCODONE    IR 5 milliGRAM(s) Oral every 3 hours  prenatal multivitamin 1 Tablet(s) Oral daily    MEDICATIONS  (PRN):  diphenhydrAMINE   Capsule 25 milliGRAM(s) Oral every 6 hours PRN Itching  diphenhydrAMINE   Injectable 25 milliGRAM(s) IV Push every 4 hours PRN Pruritus  glycerin Suppository - Adult 1 Suppository(s) Rectal at bedtime PRN Constipation  HYDROmorphone  Injectable 1 milliGRAM(s) IV Push every 3 hours PRN Severe Pain  lanolin Ointment 1 Application(s) Topical every 3 hours PRN Sore Nipples  naloxone Injectable 0.1 milliGRAM(s) IV Push every 3 minutes PRN For ANY of the following changes in patient status:  A. RR LESS THAN 10 breaths per minute, B. Oxygen saturation LESS THAN 90%, C. Sedation score of 6  ondansetron Injectable 4 milliGRAM(s) IV Push every 6 hours PRN Nausea  oxyCODONE    IR 5 milliGRAM(s) Oral every 3 hours PRN Mild Pain  oxyCODONE    IR 10 milliGRAM(s) Oral every 3 hours PRN Moderate Pain  oxyCODONE    IR 5 milliGRAM(s) Oral every 4 hours PRN Severe Pain (7 - 10)  simethicone 80 milliGRAM(s) Chew every 4 hours PRN Gas        Assessment and Plan  POD # 1 s/p  section   Doing well.  Encourage ambulation  lactation consult

## 2017-10-12 RX ORDER — OXYCODONE HYDROCHLORIDE 5 MG/1
5 TABLET ORAL EVERY 4 HOURS
Qty: 0 | Refills: 0 | Status: DISCONTINUED | OUTPATIENT
Start: 2017-10-12 | End: 2017-10-13

## 2017-10-12 RX ORDER — OXYCODONE HYDROCHLORIDE 5 MG/1
5 TABLET ORAL
Qty: 0 | Refills: 0 | Status: DISCONTINUED | OUTPATIENT
Start: 2017-10-12 | End: 2017-10-13

## 2017-10-12 RX ADMIN — Medication 975 MILLIGRAM(S): at 00:50

## 2017-10-12 RX ADMIN — Medication 975 MILLIGRAM(S): at 12:59

## 2017-10-12 RX ADMIN — Medication 600 MILLIGRAM(S): at 06:55

## 2017-10-12 RX ADMIN — Medication 500 MILLIGRAM(S): at 12:59

## 2017-10-12 RX ADMIN — Medication 975 MILLIGRAM(S): at 21:39

## 2017-10-12 RX ADMIN — Medication 600 MILLIGRAM(S): at 00:20

## 2017-10-12 RX ADMIN — Medication 975 MILLIGRAM(S): at 14:00

## 2017-10-12 RX ADMIN — Medication 600 MILLIGRAM(S): at 07:30

## 2017-10-12 RX ADMIN — Medication 975 MILLIGRAM(S): at 06:55

## 2017-10-12 RX ADMIN — Medication 600 MILLIGRAM(S): at 21:40

## 2017-10-12 RX ADMIN — SIMETHICONE 80 MILLIGRAM(S): 80 TABLET, CHEWABLE ORAL at 00:20

## 2017-10-12 RX ADMIN — Medication 100 MILLIGRAM(S): at 06:55

## 2017-10-12 RX ADMIN — HEPARIN SODIUM 5000 UNIT(S): 5000 INJECTION INTRAVENOUS; SUBCUTANEOUS at 12:59

## 2017-10-12 RX ADMIN — Medication 325 MILLIGRAM(S): at 12:59

## 2017-10-12 RX ADMIN — Medication 100 MILLIGRAM(S): at 19:57

## 2017-10-12 RX ADMIN — Medication 975 MILLIGRAM(S): at 00:20

## 2017-10-12 RX ADMIN — Medication 975 MILLIGRAM(S): at 07:30

## 2017-10-12 RX ADMIN — Medication 600 MILLIGRAM(S): at 14:00

## 2017-10-12 RX ADMIN — Medication 600 MILLIGRAM(S): at 21:10

## 2017-10-12 RX ADMIN — Medication 600 MILLIGRAM(S): at 12:59

## 2017-10-12 RX ADMIN — HEPARIN SODIUM 5000 UNIT(S): 5000 INJECTION INTRAVENOUS; SUBCUTANEOUS at 00:20

## 2017-10-12 RX ADMIN — Medication 325 MILLIGRAM(S): at 19:57

## 2017-10-12 RX ADMIN — Medication 1 TABLET(S): at 12:59

## 2017-10-12 RX ADMIN — Medication 600 MILLIGRAM(S): at 00:50

## 2017-10-12 RX ADMIN — Medication 975 MILLIGRAM(S): at 21:10

## 2017-10-12 NOTE — PROGRESS NOTE ADULT - SUBJECTIVE AND OBJECTIVE BOX
SUBJECTIVE:    Pain: Controlled    Complaints: None    MILESTONES:    Alert and Oriented x 3  [ x ]  Out of bed/ ambulating. [ x ]  Flatus:   Positive [ x ]  Negative [  ]  Bowel movement  [  ] Positive [  ] Negative   Voiding [x  ] Due to void [  ]   Gray/Indwelling catheter in place [  ]  Diet: Regular [ x ]  Clears [  ]  NPO [  ]    Infant feeding:  Breast [  ]   Bottle [  ]  Both [ x ]  Feeding related issues and/or concerns:      OBJECTIVE:  T(C): 36.8 (10-12-17 @ 06:30), Max: 37 (10-11-17 @ 10:30)  HR: 83 (10-12-17 @ 06:30) (75 - 85)  BP: 124/65 (10-12-17 @ 06:30) (101/57 - 126/67)  RR: 18 (10-12-17 @ 06:30) (18 - 20)  SpO2: 100% (10-12-17 @ 06:30) (99% - 100%)  Wt(kg): --                        9.2    8.21  )-----------( 241      ( 11 Oct 2017 06:00 )             27.7           Blood Type: B Positive    RPR: Negative    Rubella IgG: Positive (Positive=Immune, Negative=Non-Immune)        MEDICATIONS  (STANDING):  acetaminophen   Tablet. 975 milliGRAM(s) Oral every 6 hours  ascorbic acid 500 milliGRAM(s) Oral daily  diphtheria/tetanus/pertussis (acellular) Vaccine (ADAcel) 0.5 milliLiter(s) IntraMuscular once  docusate sodium 100 milliGRAM(s) Oral two times a day  ferrous    sulfate 325 milliGRAM(s) Oral three times a day with meals  heparin  Injectable 5000 Unit(s) SubCutaneous every 12 hours  ibuprofen  Tablet 600 milliGRAM(s) Oral every 6 hours  oxyCODONE    IR 5 milliGRAM(s) Oral every 3 hours  prenatal multivitamin 1 Tablet(s) Oral daily    MEDICATIONS  (PRN):  diphenhydrAMINE   Capsule 25 milliGRAM(s) Oral every 6 hours PRN Itching  glycerin Suppository - Adult 1 Suppository(s) Rectal at bedtime PRN Constipation  lanolin Ointment 1 Application(s) Topical every 3 hours PRN Sore Nipples  oxyCODONE    IR 5 milliGRAM(s) Oral every 4 hours PRN Severe Pain (7 - 10)  simethicone 80 milliGRAM(s) Chew every 4 hours PRN Gas        ASSESSMENT:    31y     G  1    P  1001       PO Day#  2        Delivery: Primary [ x ]    Repeat [  ]                                         Indication of procedure: Elective    Condition: Stable    Past Medical History significant for: HPI: Hx. HSV (Valtrex), Bulging disc, Gastric sleeve in 2013      Current Issues:    Breasts:  Soft [x  ]   Engorged [  ]  Nipples:  Abdomen: Soft [ x ]   Distended [  ] Nontender [  ]   Bowel sounds :  Present [  ]  Absent [  ]   Fundus:  Firm [x  ]  Boggy [  ]  Abdominal incision: Clean, Dry and Intact [x  ]  Staples [  ] Steri Strips [ x ] Dermabond [  ] Sutures [  ]    Patient wearing abdominal binder for support.    Vaginal: Lochia:  Heavy [  ]  Moderate [ x ]   Scant [  ]  Extremities: Edema [  ] Negative Melissa's Sign [  ] Nontender Mg  [ x ] Positive pedal pulses [  ]    Other relevant physical exam findings:      PLAN:    Plan: Increase ambulation, analgesia PRN and pain medication protocol standing oxycodone, ibuprofen and acetaminophen.    Diet: Regular diet    Continue routine post-operative and postpartum care.     Discharge Planning [ x ]    For discharge Today  [    ]    Consults:  Social Work [  ]  Lactation [ x ]  Other [         ]

## 2017-10-12 NOTE — PROGRESS NOTE ADULT - ATTENDING COMMENTS
Pt seen and examined by me today. I agree with findings, assessment and plan documented in resident note.

## 2017-10-13 VITALS
DIASTOLIC BLOOD PRESSURE: 74 MMHG | TEMPERATURE: 98 F | HEART RATE: 73 BPM | SYSTOLIC BLOOD PRESSURE: 121 MMHG | RESPIRATION RATE: 19 BRPM | OXYGEN SATURATION: 100 %

## 2017-10-13 RX ADMIN — HEPARIN SODIUM 5000 UNIT(S): 5000 INJECTION INTRAVENOUS; SUBCUTANEOUS at 01:10

## 2017-10-13 NOTE — PROGRESS NOTE ADULT - SUBJECTIVE AND OBJECTIVE BOX
SUBJECTIVE:    Pain: Controlled    Complaints: None    MILESTONES:    Alert and Oriented x 3  [ x ]  Out of bed/ ambulating. [ x ]  Flatus:   Positive [ x ]  Negative [  ]  Bowel movement  [  ] Positive [  ] Negative   Voiding [x  ] Due to void [  ]   Gray/Indwelling catheter in place [  ]  Diet: Regular [ x ]  Clears [  ]  NPO [  ]    Infant feeding:  Breast [  ]   Bottle [  ]  Both [x  ]  Feeding related issues and/or concerns:      OBJECTIVE:  T(C): 36.9 (10-13-17 @ 05:36), Max: 36.9 (10-12-17 @ 14:35)  HR: 73 (10-13-17 @ 05:36) (73 - 84)  BP: 121/74 (10-13-17 @ 05:36) (116/68 - 123/62)  RR: 19 (10-13-17 @ 05:36) (18 - 19)  SpO2: 100% (10-13-17 @ 05:36) (100% - 100%)  Wt(kg): --          Blood Type: B Positive    RPR: Negative    Rubella IgG: Positive (Positive=Immune, Negative=Non-Immune)        MEDICATIONS  (STANDING):  acetaminophen   Tablet. 975 milliGRAM(s) Oral every 6 hours  ascorbic acid 500 milliGRAM(s) Oral daily  diphtheria/tetanus/pertussis (acellular) Vaccine (ADAcel) 0.5 milliLiter(s) IntraMuscular once  docusate sodium 100 milliGRAM(s) Oral two times a day  ferrous    sulfate 325 milliGRAM(s) Oral three times a day with meals  heparin  Injectable 5000 Unit(s) SubCutaneous every 12 hours  ibuprofen  Tablet 600 milliGRAM(s) Oral every 6 hours  oxyCODONE    IR 5 milliGRAM(s) Oral every 3 hours  prenatal multivitamin 1 Tablet(s) Oral daily    MEDICATIONS  (PRN):  diphenhydrAMINE   Capsule 25 milliGRAM(s) Oral every 6 hours PRN Itching  glycerin Suppository - Adult 1 Suppository(s) Rectal at bedtime PRN Constipation  lanolin Ointment 1 Application(s) Topical every 3 hours PRN Sore Nipples  oxyCODONE    IR 5 milliGRAM(s) Oral every 4 hours PRN Severe Pain (7 - 10)  simethicone 80 milliGRAM(s) Chew every 4 hours PRN Gas        ASSESSMENT:    31y     G  1    P   1001      PO Day#  3        Delivery: Primary [x  ]    Repeat [  ]                                         Indication of procedure: Elective    Condition: Stable    Past Medical History significant for: HPI: Hx. HSV (Valtrex),  Bulging disc, Gastric Sleeve -       Current Issues:    Breasts:  Soft [x  ]   Engorged [  ]  Nipples:  Abdomen: Soft [ x ]   Distended [  ] Nontender [  ]   Bowel sounds :  Present [  ]  Absent [  ]   Fundus:  Firm [x  ]  Boggy [  ]  Abdominal incision: Clean, Dry and Intact [x  ]  Staples [  ] Steri Strips [ x ] Dermabond [  ] Sutures [  ]    Patient wearing abdominal binder for support.    Vaginal: Lochia:  Heavy [  ]  Moderate [ x ]   Scant [  ]  Extremities: Edema [  ] Negative Melissa's Sign [  ] Nontender Mg  [ x ] Positive pedal pulses [  ]    Other relevant physical exam findings:      PLAN:    Plan: Increase ambulation, analgesia PRN and pain medication protocol standing oxycodone, ibuprofen and acetaminophen.    Diet: Regular diet    Continue routine post-operative and postpartum care.     Discharge Planning [ x ]    For discharge Today  [  x  ]    Consults:  Social Work [  ]  Lactation [ x ]  Other [         ]

## 2017-10-18 DIAGNOSIS — O41.93X0 DISORDER OF AMNIOTIC FLUID AND MEMBRANES, UNSPECIFIED, THIRD TRIMESTER, NOT APPLICABLE OR UNSPECIFIED: ICD-10-CM

## 2017-10-18 DIAGNOSIS — O99.213 OBESITY COMPLICATING PREGNANCY, THIRD TRIMESTER: ICD-10-CM

## 2017-10-18 DIAGNOSIS — Z3A.40 40 WEEKS GESTATION OF PREGNANCY: ICD-10-CM

## 2017-10-18 DIAGNOSIS — O36.61X0 MATERNAL CARE FOR EXCESSIVE FETAL GROWTH, FIRST TRIMESTER, NOT APPLICABLE OR UNSPECIFIED: ICD-10-CM

## 2017-11-22 ENCOUNTER — NON-APPOINTMENT (OUTPATIENT)
Age: 32
End: 2017-11-22

## 2017-11-22 ENCOUNTER — APPOINTMENT (OUTPATIENT)
Dept: OBGYN | Facility: CLINIC | Age: 32
End: 2017-11-22
Payer: OTHER GOVERNMENT

## 2017-11-22 VITALS
WEIGHT: 227.31 LBS | DIASTOLIC BLOOD PRESSURE: 69 MMHG | HEART RATE: 69 BPM | BODY MASS INDEX: 36.53 KG/M2 | SYSTOLIC BLOOD PRESSURE: 109 MMHG | HEIGHT: 66 IN

## 2017-11-22 DIAGNOSIS — Z34.03 ENCOUNTER FOR SUPERVISION OF NORMAL FIRST PREGNANCY, THIRD TRIMESTER: ICD-10-CM

## 2017-11-22 PROCEDURE — 0503F POSTPARTUM CARE VISIT: CPT

## 2017-11-22 RX ORDER — VALACYCLOVIR 1 G/1
1 TABLET, FILM COATED ORAL DAILY
Qty: 30 | Refills: 1 | Status: DISCONTINUED | COMMUNITY
Start: 2017-08-21 | End: 2017-11-22

## 2018-03-26 ENCOUNTER — APPOINTMENT (OUTPATIENT)
Dept: OBGYN | Facility: CLINIC | Age: 33
End: 2018-03-26
Payer: OTHER GOVERNMENT

## 2018-03-26 VITALS
BODY MASS INDEX: 38.09 KG/M2 | WEIGHT: 237 LBS | DIASTOLIC BLOOD PRESSURE: 78 MMHG | SYSTOLIC BLOOD PRESSURE: 117 MMHG | HEART RATE: 83 BPM | HEIGHT: 66 IN

## 2018-03-26 DIAGNOSIS — Z01.419 ENCOUNTER FOR GYNECOLOGICAL EXAMINATION (GENERAL) (ROUTINE) W/OUT ABNORMAL FINDINGS: ICD-10-CM

## 2018-03-26 PROCEDURE — 99395 PREV VISIT EST AGE 18-39: CPT

## 2018-03-26 RX ORDER — NORETHINDRONE 0.35 MG/1
0.35 TABLET ORAL
Qty: 28 | Refills: 4 | Status: DISCONTINUED | COMMUNITY
Start: 2017-11-22 | End: 2018-03-26

## 2018-03-28 LAB
C TRACH RRNA SPEC QL NAA+PROBE: NOT DETECTED
N GONORRHOEA RRNA SPEC QL NAA+PROBE: NOT DETECTED
SOURCE AMPLIFICATION: NORMAL

## 2018-03-30 LAB — CYTOLOGY CVX/VAG DOC THIN PREP: NORMAL

## 2018-04-27 ENCOUNTER — RESULT REVIEW (OUTPATIENT)
Age: 33
End: 2018-04-27

## 2018-04-27 LAB — HPV HIGH+LOW RISK DNA PNL CVX: DETECTED

## 2018-11-14 ENCOUNTER — ASOB RESULT (OUTPATIENT)
Age: 33
End: 2018-11-14

## 2018-11-14 ENCOUNTER — APPOINTMENT (OUTPATIENT)
Dept: OBGYN | Facility: CLINIC | Age: 33
End: 2018-11-14
Payer: OTHER GOVERNMENT

## 2018-11-14 VITALS
WEIGHT: 240 LBS | SYSTOLIC BLOOD PRESSURE: 119 MMHG | HEART RATE: 86 BPM | BODY MASS INDEX: 38.57 KG/M2 | HEIGHT: 66 IN | DIASTOLIC BLOOD PRESSURE: 74 MMHG

## 2018-11-14 PROCEDURE — 90471 IMMUNIZATION ADMIN: CPT

## 2018-11-14 PROCEDURE — 90686 IIV4 VACC NO PRSV 0.5 ML IM: CPT

## 2018-11-14 PROCEDURE — 99213 OFFICE O/P EST LOW 20 MIN: CPT | Mod: 25

## 2018-11-14 PROCEDURE — 99213 OFFICE O/P EST LOW 20 MIN: CPT

## 2018-11-14 PROCEDURE — 76817 TRANSVAGINAL US OBSTETRIC: CPT

## 2018-11-20 LAB
BACTERIA UR CULT: NORMAL
C TRACH RRNA SPEC QL NAA+PROBE: NOT DETECTED
N GONORRHOEA RRNA SPEC QL NAA+PROBE: NOT DETECTED
SOURCE AMPLIFICATION: NORMAL

## 2018-11-29 ENCOUNTER — APPOINTMENT (OUTPATIENT)
Dept: OBGYN | Facility: CLINIC | Age: 33
End: 2018-11-29
Payer: OTHER GOVERNMENT

## 2018-11-29 ENCOUNTER — ASOB RESULT (OUTPATIENT)
Age: 33
End: 2018-11-29

## 2018-11-29 VITALS
WEIGHT: 247.8 LBS | BODY MASS INDEX: 39.82 KG/M2 | HEIGHT: 66 IN | SYSTOLIC BLOOD PRESSURE: 123 MMHG | DIASTOLIC BLOOD PRESSURE: 75 MMHG | HEART RATE: 83 BPM

## 2018-11-29 PROCEDURE — 76817 TRANSVAGINAL US OBSTETRIC: CPT

## 2018-11-29 PROCEDURE — 0501F PRENATAL FLOW SHEET: CPT

## 2018-12-26 ENCOUNTER — NON-APPOINTMENT (OUTPATIENT)
Age: 33
End: 2018-12-26

## 2018-12-26 ENCOUNTER — RECORD ABSTRACTING (OUTPATIENT)
Age: 33
End: 2018-12-26

## 2018-12-26 DIAGNOSIS — Z29.13 ENCOUNTER FOR PROPHYLACTIC RHO(D) IMMUNE GLOBULIN: ICD-10-CM

## 2018-12-26 LAB
ABO + RH PNL BLD: NORMAL
AR GENE MUT ANL BLD/T: NEGATIVE
B19V IGG SER QL IA: 0.4 INDEX
B19V IGG+IGM SER-IMP: NEGATIVE
B19V IGG+IGM SER-IMP: NORMAL
B19V IGM FLD-ACNC: 0.2 INDEX
B19V IGM SER-ACNC: NEGATIVE
BASOPHILS # BLD AUTO: 0.03 K/UL
BASOPHILS NFR BLD AUTO: 0.4 %
BLD GP AB SCN SERPL QL: NORMAL
CFTR MUT TESTED BLD/T: NEGATIVE
EOSINOPHIL # BLD AUTO: 0.06 K/UL
EOSINOPHIL NFR BLD AUTO: 0.9 %
FMR1 GENE MUT ANL BLD/T: NORMAL
HBV SURFACE AG SER QL: NONREACTIVE
HCT VFR BLD CALC: 32.3 %
HCV AB SER QL: NONREACTIVE
HCV S/CO RATIO: 0.08 S/CO
HGB A MFR BLD: 62.8 %
HGB A2 MFR BLD: 3 %
HGB BLD-MCNC: 10.8 G/DL
HGB C MFR BLD: 33.9 %
HGB F MFR BLD: 0.3 %
HGB FRACT BLD-IMP: NORMAL
HGB S BLD QL SOLY: NEGATIVE
HIV1+2 AB SPEC QL IA.RAPID: NONREACTIVE
IMM GRANULOCYTES NFR BLD AUTO: 0.1 %
LEAD BLD-MCNC: <1 UG/DL
LYMPHOCYTES # BLD AUTO: 2.13 K/UL
LYMPHOCYTES NFR BLD AUTO: 31 %
MAN DIFF?: NORMAL
MCHC RBC-ENTMCNC: 24.7 PG
MCHC RBC-ENTMCNC: 33.4 GM/DL
MCV RBC AUTO: 73.9 FL
MONOCYTES # BLD AUTO: 0.4 K/UL
MONOCYTES NFR BLD AUTO: 5.8 %
NEUTROPHILS # BLD AUTO: 4.23 K/UL
NEUTROPHILS NFR BLD AUTO: 61.8 %
PLATELET # BLD AUTO: 264 K/UL
RBC # BLD: 4.37 M/UL
RBC # FLD: 15.9 %
RUBV IGG FLD-ACNC: 4.2 INDEX
RUBV IGG SER-IMP: POSITIVE
T GONDII AB SER-IMP: NEGATIVE
T GONDII AB SER-IMP: NEGATIVE
T GONDII IGG SER QL: <3 IU/ML
T GONDII IGM SER QL: <3 AU/ML
T PALLIDUM AB SER QL IA: NEGATIVE
VZV AB TITR SER: POSITIVE
VZV IGG SER IF-ACNC: 2286 INDEX
WBC # FLD AUTO: 6.86 K/UL

## 2019-01-02 ENCOUNTER — APPOINTMENT (OUTPATIENT)
Dept: OBGYN | Facility: CLINIC | Age: 34
End: 2019-01-02
Payer: OTHER GOVERNMENT

## 2019-01-02 ENCOUNTER — NON-APPOINTMENT (OUTPATIENT)
Age: 34
End: 2019-01-02

## 2019-01-02 VITALS
WEIGHT: 248 LBS | HEIGHT: 66 IN | BODY MASS INDEX: 39.86 KG/M2 | SYSTOLIC BLOOD PRESSURE: 113 MMHG | DIASTOLIC BLOOD PRESSURE: 70 MMHG

## 2019-01-02 PROCEDURE — 0502F SUBSEQUENT PRENATAL CARE: CPT

## 2019-01-10 ENCOUNTER — ASOB RESULT (OUTPATIENT)
Age: 34
End: 2019-01-10

## 2019-01-10 ENCOUNTER — APPOINTMENT (OUTPATIENT)
Dept: ANTEPARTUM | Facility: CLINIC | Age: 34
End: 2019-01-10
Payer: OTHER GOVERNMENT

## 2019-01-10 PROCEDURE — 76813 OB US NUCHAL MEAS 1 GEST: CPT

## 2019-01-10 PROCEDURE — 76801 OB US < 14 WKS SINGLE FETUS: CPT

## 2019-01-10 PROCEDURE — 36416 COLLJ CAPILLARY BLOOD SPEC: CPT

## 2019-01-30 ENCOUNTER — RESULT REVIEW (OUTPATIENT)
Age: 34
End: 2019-01-30

## 2019-01-30 ENCOUNTER — APPOINTMENT (OUTPATIENT)
Dept: OBGYN | Facility: CLINIC | Age: 34
End: 2019-01-30
Payer: OTHER GOVERNMENT

## 2019-01-30 ENCOUNTER — MESSAGE (OUTPATIENT)
Age: 34
End: 2019-01-30

## 2019-01-30 ENCOUNTER — NON-APPOINTMENT (OUTPATIENT)
Age: 34
End: 2019-01-30

## 2019-01-30 VITALS
WEIGHT: 258 LBS | SYSTOLIC BLOOD PRESSURE: 125 MMHG | HEIGHT: 66 IN | DIASTOLIC BLOOD PRESSURE: 75 MMHG | BODY MASS INDEX: 41.46 KG/M2

## 2019-01-30 PROCEDURE — 0502F SUBSEQUENT PRENATAL CARE: CPT

## 2019-02-06 RX ORDER — VALACYCLOVIR 500 MG/1
0 TABLET, FILM COATED ORAL
Qty: 0 | Refills: 0 | COMMUNITY

## 2019-02-07 ENCOUNTER — OTHER (OUTPATIENT)
Age: 34
End: 2019-02-07

## 2019-02-08 ENCOUNTER — OTHER (OUTPATIENT)
Age: 34
End: 2019-02-08

## 2019-02-22 ENCOUNTER — ASOB RESULT (OUTPATIENT)
Age: 34
End: 2019-02-22

## 2019-02-22 ENCOUNTER — APPOINTMENT (OUTPATIENT)
Age: 34
End: 2019-02-22
Payer: OTHER GOVERNMENT

## 2019-02-22 PROCEDURE — 76811 OB US DETAILED SNGL FETUS: CPT

## 2019-02-25 ENCOUNTER — APPOINTMENT (OUTPATIENT)
Dept: OBGYN | Facility: CLINIC | Age: 34
End: 2019-02-25
Payer: OTHER GOVERNMENT

## 2019-02-25 ENCOUNTER — NON-APPOINTMENT (OUTPATIENT)
Age: 34
End: 2019-02-25

## 2019-02-25 VITALS
BODY MASS INDEX: 41.14 KG/M2 | SYSTOLIC BLOOD PRESSURE: 121 MMHG | WEIGHT: 256 LBS | DIASTOLIC BLOOD PRESSURE: 75 MMHG | HEIGHT: 66 IN

## 2019-02-25 PROCEDURE — 0502F SUBSEQUENT PRENATAL CARE: CPT

## 2019-03-28 ENCOUNTER — NON-APPOINTMENT (OUTPATIENT)
Age: 34
End: 2019-03-28

## 2019-03-28 ENCOUNTER — APPOINTMENT (OUTPATIENT)
Dept: OBGYN | Facility: CLINIC | Age: 34
End: 2019-03-28
Payer: OTHER GOVERNMENT

## 2019-03-28 VITALS
DIASTOLIC BLOOD PRESSURE: 63 MMHG | HEIGHT: 66 IN | BODY MASS INDEX: 41.49 KG/M2 | SYSTOLIC BLOOD PRESSURE: 98 MMHG | WEIGHT: 258.13 LBS

## 2019-03-28 PROCEDURE — 0502F SUBSEQUENT PRENATAL CARE: CPT

## 2019-04-15 ENCOUNTER — NON-APPOINTMENT (OUTPATIENT)
Age: 34
End: 2019-04-15

## 2019-04-15 ENCOUNTER — APPOINTMENT (OUTPATIENT)
Dept: OBGYN | Facility: CLINIC | Age: 34
End: 2019-04-15
Payer: OTHER GOVERNMENT

## 2019-04-15 VITALS
SYSTOLIC BLOOD PRESSURE: 113 MMHG | BODY MASS INDEX: 42.11 KG/M2 | WEIGHT: 262 LBS | DIASTOLIC BLOOD PRESSURE: 67 MMHG | HEIGHT: 66 IN

## 2019-04-15 PROCEDURE — 0502F SUBSEQUENT PRENATAL CARE: CPT

## 2019-04-18 ENCOUNTER — OTHER (OUTPATIENT)
Age: 34
End: 2019-04-18

## 2019-04-18 LAB
ABO + RH PNL BLD: NORMAL
BASOPHILS # BLD AUTO: 0.02 K/UL
BASOPHILS NFR BLD AUTO: 0.3 %
BLD GP AB SCN SERPL QL: NORMAL
EOSINOPHIL # BLD AUTO: 0.04 K/UL
EOSINOPHIL NFR BLD AUTO: 0.6 %
GLUCOSE 1H P 50 G GLC PO SERPL-MCNC: 99 MG/DL
HCT VFR BLD CALC: 30 %
HGB BLD-MCNC: 9.4 G/DL
IMM GRANULOCYTES NFR BLD AUTO: 0.6 %
LYMPHOCYTES # BLD AUTO: 1.66 K/UL
LYMPHOCYTES NFR BLD AUTO: 24.9 %
MAN DIFF?: NORMAL
MCHC RBC-ENTMCNC: 23.6 PG
MCHC RBC-ENTMCNC: 31.3 GM/DL
MCV RBC AUTO: 75.4 FL
MONOCYTES # BLD AUTO: 0.48 K/UL
MONOCYTES NFR BLD AUTO: 7.2 %
NEUTROPHILS # BLD AUTO: 4.44 K/UL
NEUTROPHILS NFR BLD AUTO: 66.4 %
PLATELET # BLD AUTO: 216 K/UL
RBC # BLD: 3.98 M/UL
RBC # FLD: 16.3 %
T PALLIDUM AB SER QL IA: NEGATIVE
WBC # FLD AUTO: 6.68 K/UL

## 2019-04-24 ENCOUNTER — APPOINTMENT (OUTPATIENT)
Dept: ANTEPARTUM | Facility: CLINIC | Age: 34
End: 2019-04-24
Payer: OTHER GOVERNMENT

## 2019-04-24 ENCOUNTER — ASOB RESULT (OUTPATIENT)
Age: 34
End: 2019-04-24

## 2019-04-24 PROCEDURE — 76816 OB US FOLLOW-UP PER FETUS: CPT

## 2019-04-24 PROCEDURE — 76819 FETAL BIOPHYS PROFIL W/O NST: CPT

## 2019-04-29 ENCOUNTER — APPOINTMENT (OUTPATIENT)
Dept: OBGYN | Facility: CLINIC | Age: 34
End: 2019-04-29
Payer: OTHER GOVERNMENT

## 2019-04-29 ENCOUNTER — OTHER (OUTPATIENT)
Age: 34
End: 2019-04-29

## 2019-04-29 VITALS
WEIGHT: 259.44 LBS | BODY MASS INDEX: 41.7 KG/M2 | SYSTOLIC BLOOD PRESSURE: 114 MMHG | HEIGHT: 66 IN | DIASTOLIC BLOOD PRESSURE: 72 MMHG

## 2019-04-29 PROCEDURE — 96372 THER/PROPH/DIAG INJ SC/IM: CPT

## 2019-04-29 RX ORDER — HUMAN RHO(D) IMMUNE GLOBULIN 300 UG/1
1500 INJECTION, SOLUTION INTRAMUSCULAR
Refills: 0 | Status: COMPLETED | OUTPATIENT
Start: 2019-04-29

## 2019-04-29 RX ADMIN — HUMAN RHO(D) IMMUNE GLOBULIN 0 UNIT: 300 INJECTION, SOLUTION INTRAMUSCULAR at 00:00

## 2019-04-30 ENCOUNTER — OTHER (OUTPATIENT)
Age: 34
End: 2019-04-30

## 2019-05-20 ENCOUNTER — CHART COPY (OUTPATIENT)
Age: 34
End: 2019-05-20

## 2019-05-20 ENCOUNTER — APPOINTMENT (OUTPATIENT)
Dept: OBGYN | Facility: CLINIC | Age: 34
End: 2019-05-20
Payer: OTHER GOVERNMENT

## 2019-05-20 VITALS
HEIGHT: 66 IN | BODY MASS INDEX: 41.71 KG/M2 | DIASTOLIC BLOOD PRESSURE: 65 MMHG | SYSTOLIC BLOOD PRESSURE: 103 MMHG | WEIGHT: 259.56 LBS

## 2019-05-20 PROCEDURE — 0502F SUBSEQUENT PRENATAL CARE: CPT

## 2019-05-22 ENCOUNTER — NON-APPOINTMENT (OUTPATIENT)
Age: 34
End: 2019-05-22

## 2019-06-03 ENCOUNTER — APPOINTMENT (OUTPATIENT)
Dept: OBGYN | Facility: CLINIC | Age: 34
End: 2019-06-03

## 2019-06-03 VITALS
WEIGHT: 259 LBS | BODY MASS INDEX: 41.62 KG/M2 | HEIGHT: 66 IN | SYSTOLIC BLOOD PRESSURE: 124 MMHG | DIASTOLIC BLOOD PRESSURE: 74 MMHG

## 2019-06-03 DIAGNOSIS — B00.9 HERPESVIRAL INFECTION, UNSPECIFIED: ICD-10-CM

## 2019-06-03 RX ORDER — VALACYCLOVIR 1 G/1
1 TABLET, FILM COATED ORAL DAILY
Qty: 30 | Refills: 1 | Status: ACTIVE | COMMUNITY
Start: 2019-06-03 | End: 1900-01-01

## 2019-06-17 ENCOUNTER — NON-APPOINTMENT (OUTPATIENT)
Age: 34
End: 2019-06-17

## 2019-06-17 ENCOUNTER — APPOINTMENT (OUTPATIENT)
Dept: OBGYN | Facility: CLINIC | Age: 34
End: 2019-06-17
Payer: OTHER GOVERNMENT

## 2019-06-17 VITALS
DIASTOLIC BLOOD PRESSURE: 73 MMHG | BODY MASS INDEX: 41.07 KG/M2 | SYSTOLIC BLOOD PRESSURE: 115 MMHG | HEIGHT: 66 IN | WEIGHT: 255.56 LBS

## 2019-06-17 PROCEDURE — 0502F SUBSEQUENT PRENATAL CARE: CPT

## 2019-06-21 LAB
GP B STREP DNA SPEC QL NAA+PROBE: NORMAL
GP B STREP DNA SPEC QL NAA+PROBE: NOT DETECTED
SOURCE GBS: NORMAL

## 2019-06-24 ENCOUNTER — APPOINTMENT (OUTPATIENT)
Dept: OBGYN | Facility: CLINIC | Age: 34
End: 2019-06-24
Payer: OTHER GOVERNMENT

## 2019-06-24 ENCOUNTER — NON-APPOINTMENT (OUTPATIENT)
Age: 34
End: 2019-06-24

## 2019-06-24 VITALS
SYSTOLIC BLOOD PRESSURE: 111 MMHG | HEIGHT: 66 IN | BODY MASS INDEX: 41.14 KG/M2 | WEIGHT: 256 LBS | DIASTOLIC BLOOD PRESSURE: 72 MMHG

## 2019-06-24 PROCEDURE — 59025 FETAL NON-STRESS TEST: CPT

## 2019-06-28 ENCOUNTER — OUTPATIENT (OUTPATIENT)
Dept: OUTPATIENT SERVICES | Facility: HOSPITAL | Age: 34
LOS: 1 days | End: 2019-06-28

## 2019-06-28 VITALS
SYSTOLIC BLOOD PRESSURE: 102 MMHG | HEIGHT: 66 IN | OXYGEN SATURATION: 98 % | TEMPERATURE: 98 F | DIASTOLIC BLOOD PRESSURE: 60 MMHG | HEART RATE: 96 BPM | WEIGHT: 257.06 LBS | RESPIRATION RATE: 14 BRPM

## 2019-06-28 DIAGNOSIS — Z34.90 ENCOUNTER FOR SUPERVISION OF NORMAL PREGNANCY, UNSPECIFIED, UNSPECIFIED TRIMESTER: ICD-10-CM

## 2019-06-28 DIAGNOSIS — Z98.890 OTHER SPECIFIED POSTPROCEDURAL STATES: ICD-10-CM

## 2019-06-28 DIAGNOSIS — R00.2 PALPITATIONS: ICD-10-CM

## 2019-06-28 DIAGNOSIS — E66.9 OBESITY, UNSPECIFIED: Chronic | ICD-10-CM

## 2019-06-28 LAB
APPEARANCE UR: SIGNIFICANT CHANGE UP
BACTERIA # UR AUTO: SIGNIFICANT CHANGE UP
BILIRUB UR-MCNC: NEGATIVE — SIGNIFICANT CHANGE UP
BLD GP AB SCN SERPL QL: NEGATIVE — SIGNIFICANT CHANGE UP
BLOOD UR QL VISUAL: NEGATIVE — SIGNIFICANT CHANGE UP
COLOR SPEC: YELLOW — SIGNIFICANT CHANGE UP
GLUCOSE UR-MCNC: NEGATIVE — SIGNIFICANT CHANGE UP
HCT VFR BLD CALC: 31.4 % — LOW (ref 34.5–45)
HGB BLD-MCNC: 9.8 G/DL — LOW (ref 11.5–15.5)
HYALINE CASTS # UR AUTO: HIGH
KETONES UR-MCNC: SIGNIFICANT CHANGE UP
LEUKOCYTE ESTERASE UR-ACNC: NEGATIVE — SIGNIFICANT CHANGE UP
MCHC RBC-ENTMCNC: 21.8 PG — LOW (ref 27–34)
MCHC RBC-ENTMCNC: 31.2 % — LOW (ref 32–36)
MCV RBC AUTO: 69.9 FL — LOW (ref 80–100)
MUCOUS THREADS # UR AUTO: SIGNIFICANT CHANGE UP
NITRITE UR-MCNC: NEGATIVE — SIGNIFICANT CHANGE UP
NRBC # FLD: 0 K/UL — SIGNIFICANT CHANGE UP (ref 0–0)
PH UR: 6 — SIGNIFICANT CHANGE UP (ref 5–8)
PLATELET # BLD AUTO: 245 K/UL — SIGNIFICANT CHANGE UP (ref 150–400)
PMV BLD: 10.9 FL — SIGNIFICANT CHANGE UP (ref 7–13)
PROT UR-MCNC: 70 — SIGNIFICANT CHANGE UP
RBC # BLD: 4.49 M/UL — SIGNIFICANT CHANGE UP (ref 3.8–5.2)
RBC # FLD: 18.6 % — HIGH (ref 10.3–14.5)
RBC CASTS # UR COMP ASSIST: HIGH (ref 0–?)
RH IG SCN BLD-IMP: POSITIVE — SIGNIFICANT CHANGE UP
SP GR SPEC: 1.04 — SIGNIFICANT CHANGE UP (ref 1–1.04)
SQUAMOUS # UR AUTO: SIGNIFICANT CHANGE UP
UROBILINOGEN FLD QL: SIGNIFICANT CHANGE UP
WBC # BLD: 6.57 K/UL — SIGNIFICANT CHANGE UP (ref 3.8–10.5)
WBC # FLD AUTO: 6.57 K/UL — SIGNIFICANT CHANGE UP (ref 3.8–10.5)
WBC UR QL: HIGH (ref 0–?)

## 2019-06-28 NOTE — OB PST NOTE - PROBLEM SELECTOR PLAN 2
Await cardiac evaluation to be arranged by pcp regarding palpitations ("racing" heart beat) as recent as one week ago at rest. Has had multiple episodes during her pregnancy but never told her GYN MD  * Notified MARISEL Barajas in surgeon's office

## 2019-06-28 NOTE — OB PST NOTE - NS_OBGYNHISTORY_OBGYN_ALL_OB_FT
present pregnancy with  on 19 with SIRISHA 7-15-19; 2017 baby boy (FT ) with weight 8 pounds 6 ounces

## 2019-06-28 NOTE — OB PST NOTE - NSHPPHYSICALEXAM_GEN_ALL_CORE
Constitutional: Well Developed, Well Groomed, Well Nourished, No Distress    Eyes: EOMI, conjunctiva clear    Ears: Normal    Mouth & Gums: Normal, moist    Pharynx: No tenderness    Tonsils: No Redness    Neck: Supple, no JVD, normal thyroid glands    Breast: no tenderness    Back:  ROM intact    Respiratory: CTA, no rales, non rhonchi, no wheezing    Cardiovascular:  Regular rate and rhythm, no rubs or murmur    Gastrointestinal: Soft, non-tender, non distention, no masses palpable, bowel sound normal, no abdominal pain    Extremities: No  pedal edema    Vascular: Radial Pulse normal     Neurological: alert & oriented x 3,     Skin: warm and dry, normal color    Lymph Nodes: no palpable neck lymph nodes    Musculoskeletal: ROM intact    Psychiatric: normal affect, normal behavior Constitutional: Well Developed, Well Groomed, Well Nourished, No Distress    Eyes: EOMI, conjunctiva clear    Ears: Normal    Mouth & Gums: Normal, moist    Pharynx: No tenderness    Tonsils: No Redness    Neck: Supple, no JVD, normal thyroid glands    Breast: no tenderness    Back:  ROM intact    Respiratory: CTA, no rales, non rhonchi, no wheezing    Cardiovascular:  heart murmur auscultated 2nd RICS; Regular rate and rhythm, no rubs    Gastrointestinal: Soft, non-tender, non distention, no masses palpable, bowel sound normal, no abdominal pain    Extremities: No  pedal edema    Vascular: Radial Pulse normal     Neurological: alert & oriented x 3,     Skin: warm and dry, normal color    Lymph Nodes: no palpable neck lymph nodes    Musculoskeletal: ROM intact    Psychiatric: normal affect, normal behavior

## 2019-06-28 NOTE — OB PST NOTE - PROBLEM SELECTOR PLAN 1
This is a 32 y/o female who on 19 19 is scheduled for repeat  x 1 with SIRISHA 7-15-19  * Given preop and cleanser instructions with good teach back and patient verbalized understanding  * Instructed to ask GYN regarding any pre op medications

## 2019-06-28 NOTE — OB PST NOTE - HISTORY OF PRESENT ILLNESS
This is a 32 y/o female who on 19 is scheduled for repeat  x 1 with SIRISHA  7-15-19 This is a 32 y/o female who on 19 is scheduled for repeat  x 1 with SIRISHA  7-15-19; Last fetal movement  few moments ago; denies vaginal bleeding

## 2019-06-28 NOTE — OB PST NOTE - NSANTHOSAYNRD_GEN_A_CORE
No. DILIP screening performed.  STOP BANG Legend: 0-2 = LOW Risk; 3-4 = INTERMEDIATE Risk; 5-8 = HIGH Risk

## 2019-06-28 NOTE — OB PST NOTE - NSHPREVIEWOFSYSTEMS_GEN_ALL_CORE
General: No fever, chills, sweating,    Skin: No rashes, itching, or dryness    Breast: No tenderness      Ophthalmologic: No diplopia, photophobia,     ENMT Symptoms: No hearing difficulty, no nasal congestion    Respiratory and Thorax: No wheezing, dyspnea, cough,     Cardiovascular: c/o palpitations ("racing") as recent as last week; No chest pain, dyspnea on exertion    Gastrointestinal: No nausea, vomiting, diarrhea, constipation, no abdominal pain    Genitourinary/ Pelvis: No hematuria, renal colic, or flank pain.     Musculoskeletal: No arthralgia,    Neurological: No seizures, CVA or migraines    Psychiatric: No suicidal ideation, depression, anxiety,    Hematology: No gum bleeding, nose bleeding,     Lymphatic: No enlarged or tender lymph nodes    Endocrine: No heat or cold intolerance    Immunologic: No recurrent or persistent infections General: No fever, chills, sweating,    Skin: No rashes, itching, or dryness    Breast: No tenderness      Ophthalmologic: No diplopia, photophobia,     ENMT Symptoms: No hearing difficulty, no nasal congestion    Respiratory and Thorax: No wheezing, dyspnea, cough,     Cardiovascular: c/o palpitations ("racing") as recent as last week while at rest. Has had multiple episodes during pregnancy but never told her GYN MD; No chest pain, dyspnea on exertion    Gastrointestinal: No nausea, vomiting, diarrhea, constipation, no abdominal pain    Genitourinary/ Pelvis: No hematuria, renal colic, or flank pain.     Musculoskeletal: No arthralgia,    Neurological: No seizures, CVA or migraines    Psychiatric: No suicidal ideation, depression, anxiety,    Hematology: No gum bleeding, nose bleeding,     Lymphatic: No enlarged or tender lymph nodes    Endocrine: No heat or cold intolerance    Immunologic: No recurrent or persistent infections

## 2019-06-29 LAB — T PALLIDUM AB TITR SER: NEGATIVE — SIGNIFICANT CHANGE UP

## 2019-07-01 ENCOUNTER — NON-APPOINTMENT (OUTPATIENT)
Age: 34
End: 2019-07-01

## 2019-07-01 ENCOUNTER — APPOINTMENT (OUTPATIENT)
Dept: OBGYN | Facility: CLINIC | Age: 34
End: 2019-07-01
Payer: OTHER GOVERNMENT

## 2019-07-01 VITALS
DIASTOLIC BLOOD PRESSURE: 77 MMHG | BODY MASS INDEX: 40.98 KG/M2 | SYSTOLIC BLOOD PRESSURE: 120 MMHG | WEIGHT: 255 LBS | HEIGHT: 66 IN

## 2019-07-01 PROCEDURE — 0502F SUBSEQUENT PRENATAL CARE: CPT

## 2019-07-02 ENCOUNTER — NON-APPOINTMENT (OUTPATIENT)
Age: 34
End: 2019-07-02

## 2019-07-02 ENCOUNTER — APPOINTMENT (OUTPATIENT)
Dept: CARDIOLOGY | Facility: CLINIC | Age: 34
End: 2019-07-02
Payer: OTHER GOVERNMENT

## 2019-07-02 VITALS
BODY MASS INDEX: 40.98 KG/M2 | HEART RATE: 106 BPM | RESPIRATION RATE: 14 BRPM | HEIGHT: 66 IN | DIASTOLIC BLOOD PRESSURE: 65 MMHG | WEIGHT: 255 LBS | SYSTOLIC BLOOD PRESSURE: 91 MMHG | OXYGEN SATURATION: 99 %

## 2019-07-02 DIAGNOSIS — R01.1 CARDIAC MURMUR, UNSPECIFIED: ICD-10-CM

## 2019-07-02 PROBLEM — D64.9 ANEMIA, UNSPECIFIED: Chronic | Status: ACTIVE | Noted: 2019-06-28

## 2019-07-02 PROBLEM — A60.00 HERPESVIRAL INFECTION OF UROGENITAL SYSTEM, UNSPECIFIED: Chronic | Status: ACTIVE | Noted: 2019-06-28

## 2019-07-02 PROCEDURE — 93000 ELECTROCARDIOGRAM COMPLETE: CPT

## 2019-07-02 PROCEDURE — 99204 OFFICE O/P NEW MOD 45 MIN: CPT

## 2019-07-02 NOTE — DISCUSSION/SUMMARY
[FreeTextEntry1] : 33 year old woman  36 weeks pregnant scheduled for c section on Tuesday here for eval of murmur\par -will check TTE\par FU thereafter

## 2019-07-02 NOTE — PHYSICAL EXAM
[General Appearance - Well Developed] : well developed [Well Groomed] : well groomed [Normal Appearance] : normal appearance [General Appearance - Well Nourished] : well nourished [No Deformities] : no deformities [Normal Oral Mucosa] : normal oral mucosa [General Appearance - In No Acute Distress] : no acute distress [No Oral Cyanosis] : no oral cyanosis [No Oral Pallor] : no oral pallor [Normal Jugular Venous A Waves Present] : normal jugular venous A waves present [Normal Jugular Venous V Waves Present] : normal jugular venous V waves present [No Jugular Venous Ramos A Waves] : no jugular venous ramos A waves [Heart Rate And Rhythm] : heart rate and rhythm were normal [Heart Sounds] : normal S1 and S2 [Murmurs] : no murmurs present [Respiration, Rhythm And Depth] : normal respiratory rhythm and effort [Exaggerated Use Of Accessory Muscles For Inspiration] : no accessory muscle use [Abdomen Soft] : soft [Auscultation Breath Sounds / Voice Sounds] : lungs were clear to auscultation bilaterally [Abdomen Tenderness] : non-tender [Abdomen Mass (___ Cm)] : no abdominal mass palpated [Abnormal Walk] : normal gait [Gait - Sufficient For Exercise Testing] : the gait was sufficient for exercise testing [Skin Color & Pigmentation] : normal skin color and pigmentation [] : no rash [No Venous Stasis] : no venous stasis [Skin Lesions] : no skin lesions [No Skin Ulcers] : no skin ulcer [No Xanthoma] : no  xanthoma was observed

## 2019-07-02 NOTE — HISTORY OF PRESENT ILLNESS
[FreeTextEntry1] :  who is 36 weeks pregnant who comes in for evaluation of palpitations that she mentioned during preop eval for c section next week. Does state that she had a heart murmur in childhood that hasn't been followed. No known echo or any workup\par Palpitations can happen randomly- feels her hr go up at various times, will last a minute and then go away\par First pregnancy was without event delivered via c section for failure to progress at 40 weeks and 6 day\par No issues postpartum. No history preeclampsia.\par No chest pain, dyspnea or edema\par Born in [place]-no history childhood illnesses\par

## 2019-07-05 ENCOUNTER — OUTPATIENT (OUTPATIENT)
Dept: OUTPATIENT SERVICES | Facility: HOSPITAL | Age: 34
LOS: 1 days | End: 2019-07-05
Payer: OTHER GOVERNMENT

## 2019-07-05 ENCOUNTER — APPOINTMENT (OUTPATIENT)
Dept: CV DIAGNOSITCS | Facility: HOSPITAL | Age: 34
End: 2019-07-05

## 2019-07-05 DIAGNOSIS — E66.9 OBESITY, UNSPECIFIED: Chronic | ICD-10-CM

## 2019-07-05 DIAGNOSIS — R01.1 CARDIAC MURMUR, UNSPECIFIED: ICD-10-CM

## 2019-07-05 PROCEDURE — 93306 TTE W/DOPPLER COMPLETE: CPT | Mod: 26

## 2019-07-08 ENCOUNTER — TRANSCRIPTION ENCOUNTER (OUTPATIENT)
Age: 34
End: 2019-07-08

## 2019-07-09 ENCOUNTER — APPOINTMENT (OUTPATIENT)
Dept: OBGYN | Facility: HOSPITAL | Age: 34
End: 2019-07-09

## 2019-07-09 ENCOUNTER — INPATIENT (INPATIENT)
Facility: HOSPITAL | Age: 34
LOS: 2 days | Discharge: HOME | End: 2019-07-12
Attending: OBSTETRICS & GYNECOLOGY | Admitting: OBSTETRICS & GYNECOLOGY
Payer: OTHER GOVERNMENT

## 2019-07-09 ENCOUNTER — TRANSCRIPTION ENCOUNTER (OUTPATIENT)
Age: 34
End: 2019-07-09

## 2019-07-09 VITALS
RESPIRATION RATE: 18 BRPM | HEIGHT: 66 IN | SYSTOLIC BLOOD PRESSURE: 101 MMHG | TEMPERATURE: 99 F | WEIGHT: 255.07 LBS | HEART RATE: 77 BPM | DIASTOLIC BLOOD PRESSURE: 65 MMHG

## 2019-07-09 DIAGNOSIS — E66.9 OBESITY, UNSPECIFIED: Chronic | ICD-10-CM

## 2019-07-09 DIAGNOSIS — Z98.891 HISTORY OF UTERINE SCAR FROM PREVIOUS SURGERY: ICD-10-CM

## 2019-07-09 DIAGNOSIS — Z98.890 OTHER SPECIFIED POSTPROCEDURAL STATES: ICD-10-CM

## 2019-07-09 LAB
BLD GP AB SCN SERPL QL: NEGATIVE — SIGNIFICANT CHANGE UP
HCT VFR BLD CALC: 28.4 % — LOW (ref 34.5–45)
HCT VFR BLD CALC: 29.3 % — LOW (ref 34.5–45)
HGB BLD-MCNC: 8.8 G/DL — LOW (ref 11.5–15.5)
HGB BLD-MCNC: 9.1 G/DL — LOW (ref 11.5–15.5)
MCHC RBC-ENTMCNC: 21.6 PG — LOW (ref 27–34)
MCHC RBC-ENTMCNC: 21.7 PG — LOW (ref 27–34)
MCHC RBC-ENTMCNC: 31 % — LOW (ref 32–36)
MCHC RBC-ENTMCNC: 31.1 % — LOW (ref 32–36)
MCV RBC AUTO: 69.6 FL — LOW (ref 80–100)
MCV RBC AUTO: 70 FL — LOW (ref 80–100)
NRBC # FLD: 0 K/UL — SIGNIFICANT CHANGE UP (ref 0–0)
NRBC # FLD: 0.02 K/UL — SIGNIFICANT CHANGE UP (ref 0–0)
PLATELET # BLD AUTO: 218 K/UL — SIGNIFICANT CHANGE UP (ref 150–400)
PLATELET # BLD AUTO: 247 K/UL — SIGNIFICANT CHANGE UP (ref 150–400)
PMV BLD: 10.7 FL — SIGNIFICANT CHANGE UP (ref 7–13)
PMV BLD: 11.6 FL — SIGNIFICANT CHANGE UP (ref 7–13)
RBC # BLD: 4.06 M/UL — SIGNIFICANT CHANGE UP (ref 3.8–5.2)
RBC # BLD: 4.21 M/UL — SIGNIFICANT CHANGE UP (ref 3.8–5.2)
RBC # FLD: 18.2 % — HIGH (ref 10.3–14.5)
RBC # FLD: 18.3 % — HIGH (ref 10.3–14.5)
RH IG SCN BLD-IMP: POSITIVE — SIGNIFICANT CHANGE UP
WBC # BLD: 6.81 K/UL — SIGNIFICANT CHANGE UP (ref 3.8–10.5)
WBC # BLD: 7.13 K/UL — SIGNIFICANT CHANGE UP (ref 3.8–10.5)
WBC # FLD AUTO: 6.81 K/UL — SIGNIFICANT CHANGE UP (ref 3.8–10.5)
WBC # FLD AUTO: 7.13 K/UL — SIGNIFICANT CHANGE UP (ref 3.8–10.5)

## 2019-07-09 PROCEDURE — 86077 PHYS BLOOD BANK SERV XMATCH: CPT

## 2019-07-09 PROCEDURE — 59514 CESAREAN DELIVERY ONLY: CPT | Mod: AS,U9

## 2019-07-09 PROCEDURE — 59510 CESAREAN DELIVERY: CPT | Mod: U9,UB,GC

## 2019-07-09 RX ORDER — SODIUM CHLORIDE 9 MG/ML
1000 INJECTION, SOLUTION INTRAVENOUS ONCE
Refills: 0 | Status: COMPLETED | OUTPATIENT
Start: 2019-07-09 | End: 2019-07-09

## 2019-07-09 RX ORDER — METOCLOPRAMIDE HCL 10 MG
10 TABLET ORAL ONCE
Refills: 0 | Status: DISCONTINUED | OUTPATIENT
Start: 2019-07-09 | End: 2019-07-09

## 2019-07-09 RX ORDER — LANOLIN
1 OINTMENT (GRAM) TOPICAL EVERY 6 HOURS
Refills: 0 | Status: DISCONTINUED | OUTPATIENT
Start: 2019-07-10 | End: 2019-07-12

## 2019-07-09 RX ORDER — DEXAMETHASONE 0.5 MG/5ML
4 ELIXIR ORAL EVERY 6 HOURS
Refills: 0 | Status: DISCONTINUED | OUTPATIENT
Start: 2019-07-09 | End: 2019-07-09

## 2019-07-09 RX ORDER — DIPHENHYDRAMINE HCL 50 MG
25 CAPSULE ORAL EVERY 6 HOURS
Refills: 0 | Status: DISCONTINUED | OUTPATIENT
Start: 2019-07-10 | End: 2019-07-12

## 2019-07-09 RX ORDER — HEPARIN SODIUM 5000 [USP'U]/ML
10000 INJECTION INTRAVENOUS; SUBCUTANEOUS EVERY 12 HOURS
Refills: 0 | Status: DISCONTINUED | OUTPATIENT
Start: 2019-07-09 | End: 2019-07-12

## 2019-07-09 RX ORDER — SODIUM CHLORIDE 9 MG/ML
1000 INJECTION, SOLUTION INTRAVENOUS
Refills: 0 | Status: DISCONTINUED | OUTPATIENT
Start: 2019-07-09 | End: 2019-07-10

## 2019-07-09 RX ORDER — SODIUM CHLORIDE 9 MG/ML
1000 INJECTION, SOLUTION INTRAVENOUS ONCE
Refills: 0 | Status: DISCONTINUED | OUTPATIENT
Start: 2019-07-09 | End: 2019-07-09

## 2019-07-09 RX ORDER — HYDROMORPHONE HYDROCHLORIDE 2 MG/ML
0.5 INJECTION INTRAMUSCULAR; INTRAVENOUS; SUBCUTANEOUS
Refills: 0 | Status: DISCONTINUED | OUTPATIENT
Start: 2019-07-09 | End: 2019-07-09

## 2019-07-09 RX ORDER — MAGNESIUM HYDROXIDE 400 MG/1
30 TABLET, CHEWABLE ORAL
Refills: 0 | Status: DISCONTINUED | OUTPATIENT
Start: 2019-07-10 | End: 2019-07-12

## 2019-07-09 RX ORDER — DOCUSATE SODIUM 100 MG
100 CAPSULE ORAL
Refills: 0 | Status: DISCONTINUED | OUTPATIENT
Start: 2019-07-10 | End: 2019-07-11

## 2019-07-09 RX ORDER — IBUPROFEN 200 MG
1 TABLET ORAL
Qty: 0 | Refills: 0 | DISCHARGE

## 2019-07-09 RX ORDER — ACETAMINOPHEN 500 MG
2 TABLET ORAL
Qty: 0 | Refills: 0 | DISCHARGE

## 2019-07-09 RX ORDER — SODIUM CHLORIDE 9 MG/ML
1000 INJECTION, SOLUTION INTRAVENOUS
Refills: 0 | Status: DISCONTINUED | OUTPATIENT
Start: 2019-07-09 | End: 2019-07-09

## 2019-07-09 RX ORDER — OXYCODONE HYDROCHLORIDE 5 MG/1
5 TABLET ORAL ONCE
Refills: 0 | Status: DISCONTINUED | OUTPATIENT
Start: 2019-07-10 | End: 2019-07-12

## 2019-07-09 RX ORDER — BUTORPHANOL TARTRATE 2 MG/ML
0.12 INJECTION, SOLUTION INTRAMUSCULAR; INTRAVENOUS EVERY 6 HOURS
Refills: 0 | Status: DISCONTINUED | OUTPATIENT
Start: 2019-07-09 | End: 2019-07-09

## 2019-07-09 RX ORDER — FAMOTIDINE 10 MG/ML
20 INJECTION INTRAVENOUS ONCE
Refills: 0 | Status: DISCONTINUED | OUTPATIENT
Start: 2019-07-09 | End: 2019-07-09

## 2019-07-09 RX ORDER — OXYCODONE HYDROCHLORIDE 5 MG/1
10 TABLET ORAL
Refills: 0 | Status: DISCONTINUED | OUTPATIENT
Start: 2019-07-09 | End: 2019-07-09

## 2019-07-09 RX ORDER — NALOXONE HYDROCHLORIDE 4 MG/.1ML
0.1 SPRAY NASAL
Refills: 0 | Status: DISCONTINUED | OUTPATIENT
Start: 2019-07-09 | End: 2019-07-09

## 2019-07-09 RX ORDER — ONDANSETRON 8 MG/1
4 TABLET, FILM COATED ORAL EVERY 6 HOURS
Refills: 0 | Status: DISCONTINUED | OUTPATIENT
Start: 2019-07-09 | End: 2019-07-09

## 2019-07-09 RX ORDER — ACETAMINOPHEN 500 MG
975 TABLET ORAL
Refills: 0 | Status: DISCONTINUED | OUTPATIENT
Start: 2019-07-10 | End: 2019-07-12

## 2019-07-09 RX ORDER — CITRIC ACID/SODIUM CITRATE 300-500 MG
30 SOLUTION, ORAL ORAL ONCE
Refills: 0 | Status: DISCONTINUED | OUTPATIENT
Start: 2019-07-09 | End: 2019-07-09

## 2019-07-09 RX ORDER — KETOROLAC TROMETHAMINE 30 MG/ML
30 SYRINGE (ML) INJECTION EVERY 6 HOURS
Refills: 0 | Status: DISCONTINUED | OUTPATIENT
Start: 2019-07-09 | End: 2019-07-10

## 2019-07-09 RX ORDER — OXYTOCIN 10 UNIT/ML
333.33 VIAL (ML) INJECTION
Qty: 20 | Refills: 0 | Status: DISCONTINUED | OUTPATIENT
Start: 2019-07-09 | End: 2019-07-10

## 2019-07-09 RX ORDER — OXYCODONE HYDROCHLORIDE 5 MG/1
5 TABLET ORAL
Refills: 0 | Status: DISCONTINUED | OUTPATIENT
Start: 2019-07-10 | End: 2019-07-12

## 2019-07-09 RX ORDER — OXYCODONE HYDROCHLORIDE 5 MG/1
5 TABLET ORAL
Refills: 0 | Status: DISCONTINUED | OUTPATIENT
Start: 2019-07-09 | End: 2019-07-09

## 2019-07-09 RX ORDER — GLYCERIN ADULT
1 SUPPOSITORY, RECTAL RECTAL AT BEDTIME
Refills: 0 | Status: DISCONTINUED | OUTPATIENT
Start: 2019-07-10 | End: 2019-07-12

## 2019-07-09 RX ORDER — SIMETHICONE 80 MG/1
80 TABLET, CHEWABLE ORAL EVERY 4 HOURS
Refills: 0 | Status: DISCONTINUED | OUTPATIENT
Start: 2019-07-10 | End: 2019-07-12

## 2019-07-09 RX ORDER — VALACYCLOVIR 500 MG/1
0 TABLET, FILM COATED ORAL
Qty: 0 | Refills: 0 | DISCHARGE

## 2019-07-09 RX ORDER — ONDANSETRON 8 MG/1
4 TABLET, FILM COATED ORAL ONCE
Refills: 0 | Status: DISCONTINUED | OUTPATIENT
Start: 2019-07-09 | End: 2019-07-09

## 2019-07-09 RX ORDER — TETANUS TOXOID, REDUCED DIPHTHERIA TOXOID AND ACELLULAR PERTUSSIS VACCINE, ADSORBED 5; 2.5; 8; 8; 2.5 [IU]/.5ML; [IU]/.5ML; UG/.5ML; UG/.5ML; UG/.5ML
0.5 SUSPENSION INTRAMUSCULAR ONCE
Refills: 0 | Status: DISCONTINUED | OUTPATIENT
Start: 2019-07-10 | End: 2019-07-12

## 2019-07-09 RX ORDER — HYDROMORPHONE HYDROCHLORIDE 2 MG/ML
1 INJECTION INTRAMUSCULAR; INTRAVENOUS; SUBCUTANEOUS
Refills: 0 | Status: DISCONTINUED | OUTPATIENT
Start: 2019-07-09 | End: 2019-07-09

## 2019-07-09 RX ADMIN — SODIUM CHLORIDE 2000 MILLILITER(S): 9 INJECTION, SOLUTION INTRAVENOUS at 12:39

## 2019-07-09 RX ADMIN — Medication 30 MILLIGRAM(S): at 17:45

## 2019-07-09 RX ADMIN — Medication 30 MILLILITER(S): at 07:15

## 2019-07-09 RX ADMIN — HEPARIN SODIUM 10000 UNIT(S): 5000 INJECTION INTRAVENOUS; SUBCUTANEOUS at 17:45

## 2019-07-09 RX ADMIN — SODIUM CHLORIDE 200 MILLILITER(S): 9 INJECTION, SOLUTION INTRAVENOUS at 07:49

## 2019-07-09 RX ADMIN — Medication 1000 MILLIUNIT(S)/MIN: at 12:39

## 2019-07-09 RX ADMIN — Medication 10 MILLIGRAM(S): at 07:15

## 2019-07-09 RX ADMIN — SODIUM CHLORIDE 2000 MILLILITER(S): 9 INJECTION, SOLUTION INTRAVENOUS at 15:26

## 2019-07-09 RX ADMIN — SODIUM CHLORIDE 2000 MILLILITER(S): 9 INJECTION, SOLUTION INTRAVENOUS at 06:00

## 2019-07-09 RX ADMIN — SODIUM CHLORIDE 105 MILLILITER(S): 9 INJECTION, SOLUTION INTRAVENOUS at 12:38

## 2019-07-09 RX ADMIN — FAMOTIDINE 20 MILLIGRAM(S): 10 INJECTION INTRAVENOUS at 07:15

## 2019-07-09 NOTE — OB PROVIDER H&P - PROBLEM SELECTOR PLAN 1
This is a 32 y/o female who on 19 19 is scheduled for repeat  x 1 with SIRISHA 7-15-19  * Given preop and cleanser instructions with good teach back and patient verbalized understanding  * Instructed to ask GYN regarding any pre op medications planned c/s

## 2019-07-09 NOTE — OB POSTPARTUM EVENT NOTE - NS_EVENTSUMMARY1_OBGYN_ALL_OB_FT
s/p scheduled rLTCS  EBL: 1000/3000/150    Pt c/o dizziness otherwise no complaints. Nothing po since 11:00pm last night

## 2019-07-09 NOTE — OB PROVIDER H&P - PMH
Anemia    Genital herpes    Pregnancy  SIRISHA 7-15-19 Anemia    Genital herpes    Heart murmur    Obesity, unspecified classification, unspecified obesity type, unspecified whether serious comorbidity present  Gastric Sleeve- 2018  Palpitation  negative EKG & ECHO- pregnancy related  Sickle cell trait

## 2019-07-09 NOTE — DISCHARGE NOTE OB - MEDICATION SUMMARY - MEDICATIONS TO TAKE
I will START or STAY ON the medications listed below when I get home from the hospital:    ibuprofen 600 mg oral tablet  -- 1 tab(s) by mouth every 6 hours  -- Indication: For  delivery delivered    Tylenol 500 mg oral tablet  -- 2 tab(s) by mouth every 6 hours  -- Indication: For  delivery delivered I will START or STAY ON the medications listed below when I get home from the hospital:    ibuprofen 600 mg oral tablet  -- 1 tab(s) by mouth every 6 hours  -- Indication: For Pain, as needed    Tylenol 500 mg oral tablet  -- 2 tab(s) by mouth every 6 hours  -- Indication: For Pain, as needed

## 2019-07-09 NOTE — OB NEONATOLOGY/PEDIATRICIAN DELIVERY SUMMARY - NSPEDSNEONOTESA_OBGYN_ALL_OB_FT
39.1wk M born to 32yo , B- s/p rhogam at 28 weeks, GBS-, PNL- and immune by repeat c/s. Baby born vigorous, w/d/s/s, Apgars 9/9. To [facility] for routine care.

## 2019-07-09 NOTE — OB PROVIDER H&P - HISTORY OF PRESENT ILLNESS
This is a 32 y/o female who on 19 is scheduled for repeat  x 1 with SIRISHA  7-15-19; Last fetal movement  few moments ago; denies vaginal bleeding 32yo female   EDC7/15/19 presents@ 39.1wks for scheduled rLTCS  +AP course heart palpitations, neg echo & ekg negative 19, otherwise unremarkable.   Denies VB,ROM or UCs today.  +FM

## 2019-07-09 NOTE — OB PROVIDER H&P - NSHPPHYSICALEXAM_GEN_ALL_CORE
Constitutional: Well Developed, Well Groomed, Well Nourished, No Distress    Eyes: EOMI, conjunctiva clear    Ears: Normal    Mouth & Gums: Normal, moist    Pharynx: No tenderness    Tonsils: No Redness    Neck: Supple, no JVD, normal thyroid glands    Breast: no tenderness    Back:  ROM intact    Respiratory: CTA, no rales, non rhonchi, no wheezing    Cardiovascular:  heart murmur auscultated 2nd RICS; Regular rate and rhythm, no rubs    Gastrointestinal: Soft, non-tender, non distention, no masses palpable, bowel sound normal, no abdominal pain    Extremities: No  pedal edema    Vascular: Radial Pulse normal     Neurological: alert & oriented x 3,     Skin: warm and dry, normal color    Lymph Nodes: no palpable neck lymph nodes    Musculoskeletal: ROM intact    Psychiatric: normal affect, normal behavior T(C): 37.0 (07-09-19 @ 06:42), Max: 37.0 (07-09-19 @ 06:42)  HR: 77 (07-09-19 @ 06:41) (77 - 77)  BP: 101/65 (07-09-19 @ 06:41) (101/65 - 101/65)    Heart: RRR, S1&S2, no S3  Lungs: Clear bilateral to auscultation, good inspiratory /expiratory effort              no rhonchi, no rales  Abd: Gravid  EFM:  120 bpm/moderate variabilty/+accelerations/no decelerations/CAT 1  TOCO:  no UC  Ext: FROM, minimal edema

## 2019-07-09 NOTE — OB PROVIDER H&P - NSHPREVIEWOFSYSTEMS_GEN_ALL_CORE
General: No fever, chills, sweating,    Skin: No rashes, itching, or dryness    Breast: No tenderness      Ophthalmologic: No diplopia, photophobia,     ENMT Symptoms: No hearing difficulty, no nasal congestion    Respiratory and Thorax: No wheezing, dyspnea, cough,     Cardiovascular: c/o palpitations ("racing") as recent as last week while at rest. Has had multiple episodes during pregnancy but never told her GYN MD; No chest pain, dyspnea on exertion    Gastrointestinal: No nausea, vomiting, diarrhea, constipation, no abdominal pain    Genitourinary/ Pelvis: No hematuria, renal colic, or flank pain.     Musculoskeletal: No arthralgia,    Neurological: No seizures, CVA or migraines    Psychiatric: No suicidal ideation, depression, anxiety,    Hematology: No gum bleeding, nose bleeding,     Lymphatic: No enlarged or tender lymph nodes    Endocrine: No heat or cold intolerance    Immunologic: No recurrent or persistent infections denies

## 2019-07-09 NOTE — DISCHARGE NOTE OB - MEDICATION SUMMARY - MEDICATIONS TO STOP TAKING
I will STOP taking the medications listed below when I get home from the hospital:    valACYclovir 500 mg oral tablet  -- 2x/day

## 2019-07-09 NOTE — DISCHARGE NOTE OB - MATERIALS PROVIDED
[facility] [place] Screening Program/Back To Sleep Handout/Breastfeeding Mother’s Support Group Information/Guide to Postpartum Care/Birth Certificate Instructions/[facility] Hearing Screen Program/Breastfeeding Guide and Packet/[place]  Immunization Record/Breastfeeding Log/Bottle Feeding Log/Shaken Baby Prevention Handout

## 2019-07-09 NOTE — DISCHARGE NOTE OB - PATIENT PORTAL LINK FT
You can access the [facility] Portal, offered by [facility], by registering with the following website: [email or web address]

## 2019-07-09 NOTE — OB PROVIDER H&P - PROBLEM SELECTOR PLAN 2
Await cardiac evaluation to be arranged by pcp regarding palpitations ("racing" heart beat) as recent as one week ago at rest. Has had multiple episodes during her pregnancy but never told her GYN MD  * Notified MARISEL Barajas in surgeon's office neg ECHO & EKG

## 2019-07-09 NOTE — OB PROVIDER H&P - NS_OBGYNHISTORY_OBGYN_ALL_OB_FT
present pregnancy with  on 19 with SIRISHA 7-15-19; 2017 baby boy (FT ) with weight 8 pounds 6 ounces OBHx:            10/10/2017- pLTCS- arrest @ 2cm- male;8.6#

## 2019-07-09 NOTE — OB PROVIDER H&P - ASSESSMENT
Pregnancy Admit to L&D  scheduled rLTCS  NPO  routine labs  EFM/TOCO  anesthesia consult  Trina Pillai PAC  07-09-19 @ 06:54

## 2019-07-09 NOTE — CHART NOTE - NSCHARTNOTEFT_GEN_A_CORE
R1 Post Op Check    S: 34 yo  s/p rLTCS. Called to bedside to evaluate patient for lightheadedness and decreased UOP (10ml in last hour).    O:   VS  T(C): 37.0 (19 @ 06:42)  HR: 60 (19 @ 12:00)  BP: 96/61 (19 @ 12:00)  RR: 14 (19 @ 12:00)  SpO2: 97% (19 @ 12:00)    Gen: Patient lying in bed, appears groggy  CV: RRR  Chest: CTABL  Abd: soft, appropriately tender, nondistended; no excess bleeding from incision bandage  : yellow, concentrated urine draining from parker    A/P 34 yo  s/p rLTCS. Patient is mildly hypotensive (although BPs have been stable throughout hospital course) with most recent UOP decreased and concentrated urine.   -LR 1L IV Bolus now  -continue to follow UOP  -Will reevaluate after bolus    Seen with Dr. Bobbi Dial, PGY3  R. Frankel PGY1

## 2019-07-09 NOTE — DISCHARGE NOTE OB - CARE PROVIDER_API CALL
Twila Blackburn (MD)  Obstetrics and Gynecology  [facility] [place] [address]  Phone: [phone]  Fax: [phone]  Follow Up Time:

## 2019-07-09 NOTE — OB PROVIDER DELIVERY SUMMARY - NSPROVIDERDELIVERYNOTE_OBGYN_ALL_OB_FT
Viable male infant, apgar x/x, wgt 3690gms   cephalic presentation, clear copious fluid  hysterotomy closed in single layer   omental adhesion to peritoneum noted  grossly nl uterus, tubes & ovaries  EBL:1000  UOP:150  IVF:3000  Dictation Number:  [id number] Viable male infant, apgar x/x, wgt 3690gms   cephalic presentation, clear copious fluid  hysterotomy closed in single layer   omental adhesion to peritoneum noted  grossly nl uterus, tubes & ovaries  EBL:1000  UOP:150  IVF:3000  Dictation Number:  [id number]  Sttending  was scrubbed on uncomplicated c/section

## 2019-07-09 NOTE — OB POSTPARTUM EVENT NOTE - NS_EVENTFINDINGS1_OBGYN_ALL_OB_FT
A/P Oliguria        1L IVF Boulus #2        FAST SCAN- MD Sophy PGY2       po fluid encouraged  Trina Pillai PAC A/P Oliguria        1L IVF Boulus #2        FAST SCAN- MD Sophy PGY2- Negative       po fluid encouraged  Trina Pillai PAC

## 2019-07-09 NOTE — BRIEF OPERATIVE NOTE - OPERATION/FINDINGS
Viable male infant, apgar 9/9, wgt 3630gms  cephalic presentation, clear copious fluid  omental adhesion to peritoneum noted  hysterotomy closed in single layer   grossly nl uterus, tubes & ovaries  EBL: 1000  UOP:150  IVF:3000  Dictation Number: [id number]

## 2019-07-09 NOTE — OB PROVIDER H&P - NS_MATERNALCONCERNS_OBGYN_ALL_OB_FT
present pregnancy with  on 19 with SIRISHA 7-15-19; 2017 baby boy (FT ) with weight 8 pounds 6 ounces heart palpitations- neg EKG & Echo- 7/5

## 2019-07-10 LAB
BASOPHILS # BLD AUTO: 0.03 K/UL — SIGNIFICANT CHANGE UP (ref 0–0.2)
BASOPHILS NFR BLD AUTO: 0.4 % — SIGNIFICANT CHANGE UP (ref 0–2)
EOSINOPHIL # BLD AUTO: 0.06 K/UL — SIGNIFICANT CHANGE UP (ref 0–0.5)
EOSINOPHIL NFR BLD AUTO: 0.8 % — SIGNIFICANT CHANGE UP (ref 0–6)
HCT VFR BLD CALC: 30.9 % — LOW (ref 34.5–45)
HGB BLD-MCNC: 9.8 G/DL — LOW (ref 11.5–15.5)
IMM GRANULOCYTES NFR BLD AUTO: 0.4 % — SIGNIFICANT CHANGE UP (ref 0–1.5)
LYMPHOCYTES # BLD AUTO: 1.28 K/UL — SIGNIFICANT CHANGE UP (ref 1–3.3)
LYMPHOCYTES # BLD AUTO: 17.4 % — SIGNIFICANT CHANGE UP (ref 13–44)
MCHC RBC-ENTMCNC: 22.2 PG — LOW (ref 27–34)
MCHC RBC-ENTMCNC: 31.7 % — LOW (ref 32–36)
MCV RBC AUTO: 69.9 FL — LOW (ref 80–100)
MONOCYTES # BLD AUTO: 0.5 K/UL — SIGNIFICANT CHANGE UP (ref 0–0.9)
MONOCYTES NFR BLD AUTO: 6.8 % — SIGNIFICANT CHANGE UP (ref 2–14)
NEUTROPHILS # BLD AUTO: 5.47 K/UL — SIGNIFICANT CHANGE UP (ref 1.8–7.4)
NEUTROPHILS NFR BLD AUTO: 74.2 % — SIGNIFICANT CHANGE UP (ref 43–77)
NRBC # FLD: 0 K/UL — SIGNIFICANT CHANGE UP (ref 0–0)
PLATELET # BLD AUTO: 206 K/UL — SIGNIFICANT CHANGE UP (ref 150–400)
PMV BLD: SIGNIFICANT CHANGE UP FL (ref 7–13)
RBC # BLD: 4.42 M/UL — SIGNIFICANT CHANGE UP (ref 3.8–5.2)
RBC # FLD: 18.4 % — HIGH (ref 10.3–14.5)
WBC # BLD: 7.37 K/UL — SIGNIFICANT CHANGE UP (ref 3.8–10.5)
WBC # FLD AUTO: 7.37 K/UL — SIGNIFICANT CHANGE UP (ref 3.8–10.5)

## 2019-07-10 RX ORDER — IBUPROFEN 200 MG
600 TABLET ORAL EVERY 6 HOURS
Refills: 0 | Status: DISCONTINUED | OUTPATIENT
Start: 2019-07-10 | End: 2019-07-12

## 2019-07-10 RX ADMIN — Medication 975 MILLIGRAM(S): at 12:14

## 2019-07-10 RX ADMIN — Medication 975 MILLIGRAM(S): at 20:31

## 2019-07-10 RX ADMIN — Medication 600 MILLIGRAM(S): at 17:00

## 2019-07-10 RX ADMIN — Medication 600 MILLIGRAM(S): at 23:54

## 2019-07-10 RX ADMIN — Medication 600 MILLIGRAM(S): at 23:24

## 2019-07-10 RX ADMIN — HEPARIN SODIUM 10000 UNIT(S): 5000 INJECTION INTRAVENOUS; SUBCUTANEOUS at 06:32

## 2019-07-10 RX ADMIN — Medication 30 MILLIGRAM(S): at 00:46

## 2019-07-10 RX ADMIN — HEPARIN SODIUM 10000 UNIT(S): 5000 INJECTION INTRAVENOUS; SUBCUTANEOUS at 18:26

## 2019-07-10 RX ADMIN — Medication 600 MILLIGRAM(S): at 17:31

## 2019-07-10 RX ADMIN — Medication 975 MILLIGRAM(S): at 21:01

## 2019-07-10 RX ADMIN — SIMETHICONE 80 MILLIGRAM(S): 80 TABLET, CHEWABLE ORAL at 20:33

## 2019-07-10 RX ADMIN — Medication 975 MILLIGRAM(S): at 12:44

## 2019-07-10 RX ADMIN — Medication 30 MILLIGRAM(S): at 06:32

## 2019-07-10 NOTE — PROGRESS NOTE ADULT - PROBLEM SELECTOR PLAN 1
- Continue regular diet.  - Increase ambulation.  - Continue motrin, tylenol, oxycodone PRN for pain control.  - F/u AM CBC    Robyn Frankel PGY-1

## 2019-07-10 NOTE — PROGRESS NOTE ADULT - SUBJECTIVE AND OBJECTIVE BOX
ANESTHESIA POSTOP CHECK    33y Female POSTOP DAY 1     Mild Pruritis, improving.    NO APPARENT ANESTHESIA COMPLICATIONS

## 2019-07-10 NOTE — LACTATION INITIAL EVALUATION - NS LACT CON SNS
Instructed and assisted with positioning to facilitate latch with and without nipple shield.  Infant is unable to sustain latch at this time.  Infant previously fed formula with bottle and nipple.  Discussed alternative feeding methods with patient.  Patient decided to utilize bottle and slow flow nipple. Encouraged to attempt to breastfeed infant without/ with nipple shield prior to supplementation with own expressed milk, preferably and formula if needed. Instructed to dual electric pump 8-10 times in 24 hours in order to maintain/increase breast milk supply. Feeding log reviewed, Hand expression taught.  Discussed outpatient referrals, along with lactation directory, given.  Primary RN made aware of consult and plan./expressed human milk/nipple shield

## 2019-07-10 NOTE — LACTATION INITIAL EVALUATION - LACTATION INTERVENTIONS
initiate hand expression routine/initiate skin to skin/stimulate nutritive suck using/initiate dual electric pump routine

## 2019-07-10 NOTE — PROGRESS NOTE ADULT - SUBJECTIVE AND OBJECTIVE BOX
POST OP DAY  1  s/p   SECTION    SUBJECTIVE:  I'm ok.    PAIN SCALE SCORE: [x] Refer to charted pain scores    THERAPY:  [ x ] Spinal morphine   [  ] Epidural morphine   [  ] IV PCA Hydromorphone 1 mg/ml    OBJECTIVE:  Comfortable Appearing    SEDATION SCORE:	  [ x ] Alert	    [  ] Drowsy        [  ] Arousable	[  ] Asleep	[  ] Unresponsive    Side Effects:	  [   ] None	     [  ] Nausea        [ x ] Pruritus        [  ] Weakness   [  ] Numbness        ASSESSMENT/ PLAN   [   ] Discontinue         [  ] Continue    [ x ] Change to prn Analgesics as per primary service.    DOCUMENTATION & VERIFICATION OF CURRENT MEDS [ x ] Done    COMMENTS: No Headache.  Mild Pruritis, now improving.

## 2019-07-10 NOTE — PROGRESS NOTE ADULT - SUBJECTIVE AND OBJECTIVE BOX
OB Progress Note:  Delivery, POD#1    S: 32yo POD#1 s/p LTCS . Her pain is well controlled. She is tolerating a regular diet has not yet passed flatus. Denies N/V. Denies CP/SOB/lightheadedness/dizziness.   She is ambulating without difficulty.   Voiding spontaneously  Denies heavy vaginal bleeding.    O:   Vital Signs Last 24 Hrs  T(C): 36.7 (10 Jul 2019 06:46), Max: 36.8 (2019 23:36)  T(F): 98.1 (10 Jul 2019 06:46), Max: 98.2 (2019 23:36)  HR: 71 (10 Jul 2019 06:46) (60 - 72)  BP: 103/55 (10 Jul 2019 06:46) (88/57 - 115/67)  BP(mean): 65 (2019 21:00) (59 - 78)  RR: 18 (10 Jul 2019 06:46) (13 - 26)  SpO2: 100% (10 Jul 2019 06:46) (95% - 100%)    Labs:  Blood type: B Positive  Rubella IgG: RPR: Negative                          9.1<L>   7.13 >-----------< 247    (  @ 21:05 )             29.3<L>                        8.8<L>   6.81 >-----------< 218    (  @ 15:25 )             28.4<L>                  PE:  General: NAD  Abdomen: Mildly distended, appropriately tender, Fundus firm, incision c/d/i.  VE: No heavy vaginal bleeding  Extremities: No erythema, no pitting edema

## 2019-07-11 PROBLEM — D57.3 SICKLE-CELL TRAIT: Chronic | Status: ACTIVE | Noted: 2019-07-09

## 2019-07-11 PROBLEM — R01.1 CARDIAC MURMUR, UNSPECIFIED: Chronic | Status: ACTIVE | Noted: 2019-07-09

## 2019-07-11 PROBLEM — R00.2 PALPITATIONS: Chronic | Status: ACTIVE | Noted: 2019-07-09

## 2019-07-11 PROBLEM — E66.9 OBESITY, UNSPECIFIED: Chronic | Status: ACTIVE | Noted: 2019-07-09

## 2019-07-11 RX ORDER — FERROUS SULFATE 325(65) MG
325 TABLET ORAL
Refills: 0 | Status: DISCONTINUED | OUTPATIENT
Start: 2019-07-11 | End: 2019-07-12

## 2019-07-11 RX ORDER — DOCUSATE SODIUM 100 MG
100 CAPSULE ORAL
Refills: 0 | Status: DISCONTINUED | OUTPATIENT
Start: 2019-07-11 | End: 2019-07-12

## 2019-07-11 RX ADMIN — HEPARIN SODIUM 10000 UNIT(S): 5000 INJECTION INTRAVENOUS; SUBCUTANEOUS at 18:42

## 2019-07-11 RX ADMIN — Medication 600 MILLIGRAM(S): at 06:42

## 2019-07-11 RX ADMIN — Medication 600 MILLIGRAM(S): at 06:12

## 2019-07-11 RX ADMIN — Medication 975 MILLIGRAM(S): at 02:11

## 2019-07-11 RX ADMIN — Medication 600 MILLIGRAM(S): at 13:53

## 2019-07-11 RX ADMIN — Medication 975 MILLIGRAM(S): at 10:44

## 2019-07-11 RX ADMIN — HEPARIN SODIUM 10000 UNIT(S): 5000 INJECTION INTRAVENOUS; SUBCUTANEOUS at 06:12

## 2019-07-11 RX ADMIN — Medication 975 MILLIGRAM(S): at 02:41

## 2019-07-11 RX ADMIN — Medication 975 MILLIGRAM(S): at 09:44

## 2019-07-11 RX ADMIN — Medication 600 MILLIGRAM(S): at 12:56

## 2019-07-11 RX ADMIN — Medication 1 TABLET(S): at 12:56

## 2019-07-11 RX ADMIN — Medication 600 MILLIGRAM(S): at 18:42

## 2019-07-11 RX ADMIN — Medication 100 MILLIGRAM(S): at 18:43

## 2019-07-11 RX ADMIN — Medication 325 MILLIGRAM(S): at 18:43

## 2019-07-11 NOTE — PROGRESS NOTE ADULT - ATTENDING COMMENTS
Pt seen and examined by me today. I agree with findings, assessment and plan documented in NP note.
Agree with assessment and plan. Meeting milestones for POD#1. Continue routine pain management, DVT ppx. Encourage ambulation.

## 2019-07-11 NOTE — PROVIDER CONTACT NOTE (OTHER) - BACKGROUND
C/S from 7/9/19 at 821
pt s/p repeat c/s livebirth, wirh EBL of 1000cc, urinary output this hour 10cc

## 2019-07-11 NOTE — PROGRESS NOTE ADULT - SUBJECTIVE AND OBJECTIVE BOX
SUBJECTIVE:    Pain: well controlled  Complaints:none    MILESTONES:    Alert and oriented x 3  [ x ]  Out of bed/ ambulating. [x  ]  Flatus: [ x ]  Postive [  ] Negative   Bowel movement  [x  ] Positive [  ] Negative   Voiding [ x ] Due to void [  ]   Diet: Regular [ x ]  Clears [  ]  NPO [  ]  Infant feeding:  Breast [  ]   Bottle [x  ]  Both [  ]  Feeding related inssues and/or concerns:none      OBJECTIVE:  T(C): 36.6 (19 @ 05:56), Max: 37.2 (07-10-19 @ 22:24)  HR: 77 (19 @ 05:56) (77 - 81)  BP: 101/59 (19 @ 05:56) (101/59 - 110/54)  RR: 16 (19 @ 05:56) (16 - 18)  SpO2: 100% (19 @ 05:56) (100% - 100%)  Wt(kg): --                        9.8    7.37  )-----------( 206      ( 10 Jul 2019 09:10 )             30.9     MEDICATIONS  (STANDING):  acetaminophen   Tablet .. 975 milliGRAM(s) Oral <User Schedule>  diphtheria/tetanus/pertussis (acellular) Vaccine (ADAcel) 0.5 milliLiter(s) IntraMuscular once  ferrous    sulfate 325 milliGRAM(s) Oral two times a day  heparin  Injectable 64351 Unit(s) SubCutaneous every 12 hours  ibuprofen  Tablet. 600 milliGRAM(s) Oral every 6 hours  prenatal multivitamin 1 Tablet(s) Oral daily    MEDICATIONS  (PRN):  diphenhydrAMINE 25 milliGRAM(s) Oral every 6 hours PRN Itching  docusate sodium 100 milliGRAM(s) Oral two times a day PRN Stool softening  glycerin Suppository - Adult 1 Suppository(s) Rectal at bedtime PRN Constipation  lanolin Ointment 1 Application(s) Topical every 6 hours PRN Sore Nipples  magnesium hydroxide Suspension 30 milliLiter(s) Oral two times a day PRN Constipation  oxyCODONE    IR 5 milliGRAM(s) Oral every 3 hours PRN Moderate to Severe Pain (4-10)  oxyCODONE    IR 5 milliGRAM(s) Oral once PRN Moderate to Severe Pain (4-10)  simethicone 80 milliGRAM(s) Chew every 4 hours PRN Gas    ASSESSMENT:  33y  y/o G  1 P 1   PO Day#   2     Delivery: Primary [  ]    Repeat [x  ]                                       Indication of procedure: previous c/s  Condition: Stable  Past Medical History significant for: HPI:  34yo female   EDC7/15/19 presents@ 39.1wks for scheduled rLTCS  +AP course heart palpitations, neg echo & ekg negative 19, otherwise unremarkable.   Denies VB,ROM or UCs today.  +FM (2019 06:38)    Current Issues:none  Heart:   RRR                           Lungs: clear  Breasts:  Soft [ x ]   Engorged [  ]  Abdomen: Soft [ x ] , distended [  ] nontender [ x ]   Bowel sounds :  Present [ x ]  Absent [  ]   Fundus firm [  x]  Boggy [  ]  Abdominal incision: Clean, dry and intact [ x ]  Staples [  ] Steri Strips [ x ] Dermabond [  ] Sutures [  ]  Patient wearing abdominal binder for support.  Vaginal: Lochia:  Heavy [  ]  Moderate [  ]   Scant [ x ]  Extremities: Edema [ 0 ] negative Melissa's Sign [ x ] Nontender Mg  [ x ] Positive pedal pulses [x  ]  Other relevant physical exam findings:none      PLAN:  Plan: Increase ambulation, analgesia PRN and pain medication protocol standing oxycodone, ibuprofen and acetaminophen.  Diet: Regular diet  Continue routine post-operative and postpartum care.

## 2019-07-11 NOTE — PROVIDER CONTACT NOTE (OTHER) - ASSESSMENT
Uvula elongated and appears gelatinous. No erythema noted. Pt. denies any difficulty breathing and denies pain
lungs clear, skin intact, reflexes 2+, urine appears concentrated, pt complain of feeling dizzy, no c/o of vision changes or chest discomfort

## 2019-07-11 NOTE — PROVIDER CONTACT NOTE (OTHER) - ACTION/TREATMENT ORDERED:
AURA Fine notified and evaluated pt. at bedside.  AURA Fine states she spoke with Dr. Che who wants pt. to gargle with salt water.  Pt. aware of plan, instructions given.  Will continue to monitor.
iv bolus of 1000cc ordered same in progress

## 2019-07-12 VITALS
TEMPERATURE: 98 F | RESPIRATION RATE: 17 BRPM | OXYGEN SATURATION: 99 % | HEART RATE: 61 BPM | SYSTOLIC BLOOD PRESSURE: 113 MMHG | DIASTOLIC BLOOD PRESSURE: 62 MMHG

## 2019-07-12 RX ADMIN — Medication 100 MILLIGRAM(S): at 06:51

## 2019-07-12 RX ADMIN — Medication 600 MILLIGRAM(S): at 01:06

## 2019-07-12 RX ADMIN — Medication 600 MILLIGRAM(S): at 00:35

## 2019-07-12 RX ADMIN — Medication 600 MILLIGRAM(S): at 06:10

## 2019-07-12 RX ADMIN — Medication 325 MILLIGRAM(S): at 06:51

## 2019-07-12 RX ADMIN — Medication 600 MILLIGRAM(S): at 06:44

## 2019-07-12 NOTE — PROGRESS NOTE ADULT - SUBJECTIVE AND OBJECTIVE BOX
SUBJECTIVE:    Pain: Controlled    Complaints: None    MILESTONES:    Alert and Oriented x 3  [ x ]  Out of bed/ ambulating. [ x ]  Flatus:   Positive [ x ]  Negative [  ]  Bowel movement  [  ] Positive [  ] Negative   Voiding [x  ] Due to void [  ]   Gray/Indwelling catheter in place [  ]  Diet: Regular [ x ]  Clears [  ]  NPO [  ]    Infant feeding:  Breast [  ]   Bottle [X  ]  Both [  ]  Feeding related issues and/or concerns:      OBJECTIVE:  T(C): 36.8 (19 @ 06:26), Max: 37.1 (19 @ 14:00)  HR: 61 (19 @ 06:26) (61 - 87)  BP: 113/62 (19 @ 06:26) (112/69 - 125/70)  RR: 17 (19 @ 06:26) (17 - 17)  SpO2: 99% (19 @ 06:26) (99% - 100%)  Wt(kg): --          Blood Type: B Positive    RPR: Negative          MEDICATIONS  (STANDING):  acetaminophen   Tablet .. 975 milliGRAM(s) Oral <User Schedule>  diphtheria/tetanus/pertussis (acellular) Vaccine (ADAcel) 0.5 milliLiter(s) IntraMuscular once  docusate sodium 100 milliGRAM(s) Oral two times a day  ferrous    sulfate 325 milliGRAM(s) Oral two times a day  heparin  Injectable 79642 Unit(s) SubCutaneous every 12 hours  ibuprofen  Tablet. 600 milliGRAM(s) Oral every 6 hours  prenatal multivitamin 1 Tablet(s) Oral daily    MEDICATIONS  (PRN):  diphenhydrAMINE 25 milliGRAM(s) Oral every 6 hours PRN Itching  glycerin Suppository - Adult 1 Suppository(s) Rectal at bedtime PRN Constipation  lanolin Ointment 1 Application(s) Topical every 6 hours PRN Sore Nipples  magnesium hydroxide Suspension 30 milliLiter(s) Oral two times a day PRN Constipation  oxyCODONE    IR 5 milliGRAM(s) Oral every 3 hours PRN Moderate to Severe Pain (4-10)  oxyCODONE    IR 5 milliGRAM(s) Oral once PRN Moderate to Severe Pain (4-10)  simethicone 80 milliGRAM(s) Chew every 4 hours PRN Gas        ASSESSMENT:    33y     G  2    P         PO Day#  3        Delivery: Primary [  ]    Repeat [ X ]       EBL - 1000                                  Indication of procedure: Scheduled    Condition: Stable    Past Medical History significant for: HPI:  32yo female   EDC7/15/19 presents@ 39.1wks for scheduled rLTCS  +AP course heart palpitations, neg echo & ekg negative 19, otherwise unremarkable.   Denies VB,ROM or UCs today.  +FM (2019 06:38)      Current Issues:    Breasts:  Soft [x  ]   Engorged [  ]  Nipples:  Abdomen: Soft [ x ]   Distended [  ] Nontender [  ]     Bowel sounds :  Present [  ]  Absent [  ]   Fundus:  Firm [x  ]  Boggy [  ]    Abdominal incision: Clean, Dry and Intact [x  ]  Staples [  ] Steri Strips [ X ] Dermabond [  ] Sutures [  ]    Patient wearing abdominal binder for support.    Vaginal: Lochia:  Heavy [  ]  Moderate [ x ]   Scant [  ]    Extremities: Edema [ X ] Negative Melissa's Sign [ X ] Nontender Mg  [ x ] Positive pedal pulses [  ]    Other relevant physical exam findings:      PLAN:    Plan: Increase ambulation, analgesia PRN and pain medication protocol standing oxycodone, ibuprofen and acetaminophen.    Diet: Regular diet    Continue routine post-operative and postpartum care.     Discharge Planning [ x ]    For discharge Today  [  X  ]    Consults:  Social Work [  ]  Lactation [ x ]  Other [         ]

## 2019-08-29 ENCOUNTER — APPOINTMENT (OUTPATIENT)
Dept: OBGYN | Facility: CLINIC | Age: 34
End: 2019-08-29
Payer: OTHER GOVERNMENT

## 2019-08-29 VITALS
SYSTOLIC BLOOD PRESSURE: 108 MMHG | HEART RATE: 63 BPM | DIASTOLIC BLOOD PRESSURE: 70 MMHG | HEIGHT: 66 IN | WEIGHT: 237 LBS | BODY MASS INDEX: 38.09 KG/M2

## 2019-08-29 DIAGNOSIS — R10.2 OTHER COMPLICATIONS OF THE PUERPERIUM, NOT ELSEWHERE CLASSIFIED: ICD-10-CM

## 2019-08-29 PROCEDURE — 0503F POSTPARTUM CARE VISIT: CPT

## 2019-09-16 NOTE — HISTORY OF PRESENT ILLNESS
[Postpartum Follow Up] : postpartum follow up [Delivery Date: ___] : on [unfilled] [Male] : Delivery History: baby boy [Boy] : baby is a boy [Infant's Name ___] : [unfilled] [Not Circumcised] : not circumcised [___ Lbs] : [unfilled] lbs [Living at Home] : is currently living at home [Breastfeeding] : currently nursing [Last Pap Date: ___] : Last Pap Date: [unfilled] [Primary C/S] : delivered by  section [Pertussis Vaccine] : Pertussis vaccine administered [Intended Contraception] : Intended Contraception: [Condoms] : condoms [Clean/Dry/Intact] : clean, dry and intact [Back to Normal] : is back to normal in size [Mild] : mild vaginal bleeding [Normal] : the vagina was normal [Not Done] : Examination of breasts not done [Doing Well] : is doing well [Excellent Pain Control] : has excellent pain control [No Sign of Infection] : is showing no signs of infection [None] : None [Complications:___] : no complications [Rhogam] : Rhogam was not administered [Rubella Vaccine] : Rubella vaccine was not administered [BTL] : no tubal ligation [BF with Difficulty] : nursing without difficulty [Resumed Menses] : has not resumed her menses [Resumed [place]] : has not resumed intercourse [S/Sx PP Depression] : no signs/symptoms of postpartum depression [Erythema] : not erythematous [Cervix Sample Taken] : cervical sample not taken for a Pap smear

## 2019-10-14 NOTE — OB RN PREOPERATIVE CHECKLIST - LOOSE TEETH
MD Notification    Notified Person: MD    Notified Person Name: Bolivar    Notification Date/Time: 10/14 0918    Notification Interaction: page    Purpose of Notification: Do you want pt on SSI? I saw in a note that it was mentioned but not ordered.     Orders Received:    Comments:   no

## 2019-10-26 ENCOUNTER — EMERGENCY (EMERGENCY)
Facility: HOSPITAL | Age: 34
LOS: 1 days | Discharge: HOME | End: 2019-10-26
Attending: EMERGENCY MEDICINE | Admitting: EMERGENCY MEDICINE
Payer: OTHER GOVERNMENT

## 2019-10-26 VITALS
SYSTOLIC BLOOD PRESSURE: 127 MMHG | OXYGEN SATURATION: 100 % | TEMPERATURE: 98 F | HEART RATE: 77 BPM | DIASTOLIC BLOOD PRESSURE: 72 MMHG | RESPIRATION RATE: 16 BRPM

## 2019-10-26 DIAGNOSIS — E66.9 OBESITY, UNSPECIFIED: Chronic | ICD-10-CM

## 2019-10-26 PROCEDURE — 73610 X-RAY EXAM OF ANKLE: CPT | Mod: 26,RT

## 2019-10-26 PROCEDURE — 99283 EMERGENCY DEPT VISIT LOW MDM: CPT

## 2019-10-26 PROCEDURE — 73620 X-RAY EXAM OF FOOT: CPT | Mod: 26,RT

## 2019-10-26 RX ORDER — IBUPROFEN 200 MG
600 TABLET ORAL ONCE
Refills: 0 | Status: COMPLETED | OUTPATIENT
Start: 2019-10-26 | End: 2019-10-26

## 2019-10-26 RX ADMIN — Medication 600 MILLIGRAM(S): at 17:33

## 2019-10-26 NOTE — ED PROVIDER NOTE - CLINICAL SUMMARY MEDICAL DECISION MAKING FREE TEXT BOX
- fall with right ankle/foot pain, eval fracture/dislocation, likely sprain  - XR right ankle and foot, pain control

## 2019-10-26 NOTE — ED PROVIDER NOTE - PROGRESS NOTE DETAILS
JAMES LOUIS: received pt s/p Quids eval by Dr. Perez to f/u xray. Xray foot/ankle neg acute fx/dislocation. Discussed with attending, patient can be discharged home to f/u with PCP and pod. The patient was given verbal and written discharge instructions. Specifically, instructions when to return to the ED and when to seek follow-up from their pcp was discussed. Any specialty follow-up was discussed, including how to make an appointment.  Instructions were discussed in simple, plain language and was understood by the patient. The patient understands that should their symptoms worsen or any new symptoms arise, they should return to the ED immediately for further evaluation. All pt's questions were answered. Patient verbalizes understanding.

## 2019-10-26 NOTE — ED PROVIDER NOTE - OBJECTIVE STATEMENT
33F denies pmh presents with right ankle/foot pain after mechanical trip and fall this morning.  States she stepped on a toy at home and tripped over it.  Initially walking but now unable to bear weight due to pain.  Denies other injuries.

## 2019-10-26 NOTE — ED PROVIDER NOTE - PATIENT PORTAL LINK FT
You can access the FollowMyHealth Patient Portal offered by [facility] by registering at the following website: [email or web address]. By joining [facility]’s FollowMyHealth portal, you will also be able to view your health information using other applications (apps) compatible with our system.

## 2019-10-26 NOTE — ED PROVIDER NOTE - NSFOLLOWUPINSTRUCTIONS_ED_ALL_ED_FT
Follow up with your PMD within 48-72 hours.  Recommend ortho follow up within the week. Referral list provided. Rest and elevate ankle.  Apply ice 20 minutes on 3x/day. Keep aircast on during the day for support. Take Motrin 600mg every 6-8hrs for pain with food.  Ambulate as tolerated using crutch assistance. Worsening pain, swelling, numbness, weakness or new concerning symptoms return to the Emergency Department.

## 2019-10-26 NOTE — ED PROVIDER NOTE - MUSCULOSKELETAL, MLM
No obvious deformity to right ankle/foot but swelling to base of foot and around ankle with diffuse TTP, neurovascularly intact

## 2019-12-23 ENCOUNTER — APPOINTMENT (OUTPATIENT)
Dept: OBGYN | Facility: CLINIC | Age: 34
End: 2019-12-23

## 2020-09-23 ENCOUNTER — APPOINTMENT (OUTPATIENT)
Dept: OBGYN | Facility: CLINIC | Age: 35
End: 2020-09-23
Payer: OTHER GOVERNMENT

## 2020-09-23 VITALS
TEMPERATURE: 97.8 F | HEIGHT: 66 IN | SYSTOLIC BLOOD PRESSURE: 110 MMHG | WEIGHT: 231 LBS | DIASTOLIC BLOOD PRESSURE: 70 MMHG | BODY MASS INDEX: 37.12 KG/M2

## 2020-09-23 DIAGNOSIS — Z01.419 ENCOUNTER FOR GYNECOLOGICAL EXAMINATION (GENERAL) (ROUTINE) W/OUT ABNORMAL FINDINGS: ICD-10-CM

## 2020-09-23 DIAGNOSIS — Z34.90 ENCOUNTER FOR SUPERVISION OF NORMAL PREGNANCY, UNSPECIFIED, UNSPECIFIED TRIMESTER: ICD-10-CM

## 2020-09-23 DIAGNOSIS — Z32.01 ENCOUNTER FOR PREGNANCY TEST, RESULT POSITIVE: ICD-10-CM

## 2020-09-23 PROCEDURE — 99395 PREV VISIT EST AGE 18-39: CPT

## 2020-09-23 RX ORDER — PHENTERMINE HYDROCHLORIDE 37.5 MG/1
37.5 CAPSULE ORAL
Refills: 0 | Status: ACTIVE | COMMUNITY

## 2020-09-23 RX ORDER — VALACYCLOVIR 1 G/1
1 TABLET, FILM COATED ORAL DAILY
Qty: 5 | Refills: 0 | Status: ACTIVE | COMMUNITY
Start: 2020-09-23 | End: 1900-01-01

## 2020-09-23 NOTE — HISTORY OF PRESENT ILLNESS
[N] : Patient reports normal menses [Y] : Positive pregnancy history [TextBox_4] : c/o discharge after exercise, \par genital herpes- clitoral tingling, denies lesions [PapSmeardate] : 2018 [HIVDate] : 11/2018 [SyphilisDate] : 4/2019 [HepatitisBDate] : 11/2018 [HepatitisCDate] : 11/2018 [LMPDate] : 9/5/2020 [PGHxTotal] : 2 [[facility] : 2 [[facility] : 2 [FreeTextEntry1] : 2 c- sections

## 2020-09-30 LAB
CANDIDA VAG CYTO: NOT DETECTED
CYTOLOGY CVX/VAG DOC THIN PREP: NORMAL
G VAGINALIS+PREV SP MTYP VAG QL MICRO: NOT DETECTED
HSV+VZV DNA SPEC QL NAA+PROBE: NOT DETECTED
SPECIMEN SOURCE: NORMAL
T VAGINALIS VAG QL WET PREP: NOT DETECTED

## 2020-12-23 PROBLEM — Z01.419 ENCOUNTER FOR GYNECOLOGICAL EXAMINATION: Status: RESOLVED | Noted: 2020-09-23 | Resolved: 2020-12-23

## 2021-07-19 RX ORDER — VALACYCLOVIR 500 MG/1
500 TABLET, FILM COATED ORAL TWICE DAILY
Qty: 10 | Refills: 3 | Status: ACTIVE | COMMUNITY
Start: 2019-01-30 | End: 1900-01-01

## 2022-01-19 NOTE — PATIENT PROFILE OB - BABY A: APGAR 1 MIN
FAST TRACK: colonoscopy recall  Unable to locate last colonoscopy   Screening   No family hx of polyps    Will to hold medications prior- Review list    Sent to sko to sign  schedule in [place]   9

## 2022-01-26 NOTE — OB PROVIDER H&P - PRO BLOOD TYPE INFANT
as per BB B+/B negative Propranolol Counseling:  I discussed with the patient the risks of propranolol including but not limited to low heart rate, low blood pressure, low blood sugar, restlessness and increased cold sensitivity. They should call the office if they experience any of these side effects.

## 2022-07-13 NOTE — PATIENT PROFILE OB - FUNCTIONAL LEVEL PRIOR: TOILETING

## 2022-09-13 NOTE — ED PROVIDER NOTE - CPE EDP ENMT NORM
normal...

## 2023-06-29 NOTE — PATIENT PROFILE OB - PRO GROUP B STREP MOM INFANT
positive Protopic Counseling: Patient may experience a mild burning sensation during topical application. Protopic is not approved in children less than 2 years of age. There have been case reports of hematologic and skin malignancies in patients using topical calcineurin inhibitors although causality is questionable.

## 2025-05-15 NOTE — ED PROVIDER NOTE - PMH
Pt presents to OB ED with left upper quadrant pain, nausea and vomiting since last night. History reviewed, monitors applied and vitals obtained.   Anemia    Genital herpes    Heart murmur    Obesity, unspecified classification, unspecified obesity type, unspecified whether serious comorbidity present  Gastric Sleeve- 2018  Palpitation  negative EKG & ECHO- pregnancy related  Sickle cell trait